# Patient Record
Sex: MALE | Race: WHITE | Employment: OTHER | URBAN - METROPOLITAN AREA
[De-identification: names, ages, dates, MRNs, and addresses within clinical notes are randomized per-mention and may not be internally consistent; named-entity substitution may affect disease eponyms.]

---

## 2018-02-08 RX ORDER — AZELASTINE HYDROCHLORIDE 0.5 MG/ML
1 SOLUTION/ DROPS OPHTHALMIC
Refills: 2 | COMMUNITY
Start: 2017-12-29

## 2018-02-08 RX ORDER — ATORVASTATIN CALCIUM 40 MG/1
40 TABLET, FILM COATED ORAL
COMMUNITY
Start: 2017-12-23

## 2018-02-13 ENCOUNTER — ANESTHESIA EVENT (OUTPATIENT)
Dept: SURGERY | Age: 63
DRG: 460 | End: 2018-02-13
Payer: MEDICARE

## 2018-02-14 ENCOUNTER — APPOINTMENT (OUTPATIENT)
Dept: GENERAL RADIOLOGY | Age: 63
DRG: 460 | End: 2018-02-14
Attending: ORTHOPAEDIC SURGERY
Payer: MEDICARE

## 2018-02-14 ENCOUNTER — HOSPITAL ENCOUNTER (INPATIENT)
Age: 63
LOS: 2 days | Discharge: HOME | DRG: 460 | End: 2018-02-16
Attending: ORTHOPAEDIC SURGERY | Admitting: ORTHOPAEDIC SURGERY
Payer: MEDICARE

## 2018-02-14 ENCOUNTER — ANESTHESIA (OUTPATIENT)
Dept: SURGERY | Age: 63
DRG: 460 | End: 2018-02-14
Payer: MEDICARE

## 2018-02-14 PROBLEM — M48.061 LUMBAR STENOSIS: Status: ACTIVE | Noted: 2018-02-14

## 2018-02-14 PROBLEM — G47.33 OSA (OBSTRUCTIVE SLEEP APNEA): Chronic | Status: ACTIVE | Noted: 2018-02-14

## 2018-02-14 LAB
GLUCOSE BLD STRIP.AUTO-MCNC: 107 MG/DL (ref 70–110)
GLUCOSE BLD STRIP.AUTO-MCNC: 148 MG/DL (ref 70–110)
GLUCOSE BLD STRIP.AUTO-MCNC: 151 MG/DL (ref 70–110)
GLUCOSE BLD STRIP.AUTO-MCNC: 182 MG/DL (ref 70–110)
HCT VFR BLD AUTO: 46.2 % (ref 36–48)
HGB BLD-MCNC: 16 G/DL (ref 13–16)

## 2018-02-14 PROCEDURE — 74011000258 HC RX REV CODE- 258

## 2018-02-14 PROCEDURE — 77030034850: Performed by: ORTHOPAEDIC SURGERY

## 2018-02-14 PROCEDURE — 74011250636 HC RX REV CODE- 250/636: Performed by: HOSPITALIST

## 2018-02-14 PROCEDURE — 74011250636 HC RX REV CODE- 250/636: Performed by: NURSE ANESTHETIST, CERTIFIED REGISTERED

## 2018-02-14 PROCEDURE — 74011250636 HC RX REV CODE- 250/636

## 2018-02-14 PROCEDURE — 97162 PT EVAL MOD COMPLEX 30 MIN: CPT

## 2018-02-14 PROCEDURE — 77030021678 HC GLIDESCP STAT DISP VERT -B: Performed by: ANESTHESIOLOGY

## 2018-02-14 PROCEDURE — C1762 CONN TISS, HUMAN(INC FASCIA): HCPCS | Performed by: ORTHOPAEDIC SURGERY

## 2018-02-14 PROCEDURE — 74011000250 HC RX REV CODE- 250: Performed by: ORTHOPAEDIC SURGERY

## 2018-02-14 PROCEDURE — 85018 HEMOGLOBIN: CPT | Performed by: HOSPITALIST

## 2018-02-14 PROCEDURE — 74011250636 HC RX REV CODE- 250/636: Performed by: PHYSICIAN ASSISTANT

## 2018-02-14 PROCEDURE — 74011000272 HC RX REV CODE- 272: Performed by: ORTHOPAEDIC SURGERY

## 2018-02-14 PROCEDURE — 74011250636 HC RX REV CODE- 250/636: Performed by: ORTHOPAEDIC SURGERY

## 2018-02-14 PROCEDURE — 74011000250 HC RX REV CODE- 250

## 2018-02-14 PROCEDURE — 0SG0071 FUSION OF LUMBAR VERTEBRAL JOINT WITH AUTOLOGOUS TISSUE SUBSTITUTE, POSTERIOR APPROACH, POSTERIOR COLUMN, OPEN APPROACH: ICD-10-PCS | Performed by: ORTHOPAEDIC SURGERY

## 2018-02-14 PROCEDURE — 77030008683 HC TU ET CUF COVD -A: Performed by: ANESTHESIOLOGY

## 2018-02-14 PROCEDURE — 74011636637 HC RX REV CODE- 636/637: Performed by: NURSE ANESTHETIST, CERTIFIED REGISTERED

## 2018-02-14 PROCEDURE — 77030008477 HC STYL SATN SLP COVD -A: Performed by: ANESTHESIOLOGY

## 2018-02-14 PROCEDURE — 76060000039 HC ANESTHESIA 4 TO 4.5 HR: Performed by: ORTHOPAEDIC SURGERY

## 2018-02-14 PROCEDURE — 76010000175 HC OR TIME 4 TO 4.5 HR INTENSV-TIER 1: Performed by: ORTHOPAEDIC SURGERY

## 2018-02-14 PROCEDURE — 77030027138 HC INCENT SPIROMETER -A

## 2018-02-14 PROCEDURE — 74011636637 HC RX REV CODE- 636/637: Performed by: HOSPITALIST

## 2018-02-14 PROCEDURE — 76210000016 HC OR PH I REC 1 TO 1.5 HR: Performed by: ORTHOPAEDIC SURGERY

## 2018-02-14 PROCEDURE — 77030032490 HC SLV COMPR SCD KNE COVD -B: Performed by: ORTHOPAEDIC SURGERY

## 2018-02-14 PROCEDURE — 77030011640 HC PAD GRND REM COVD -A: Performed by: ORTHOPAEDIC SURGERY

## 2018-02-14 PROCEDURE — 77030031139 HC SUT VCRL2 J&J -A: Performed by: ORTHOPAEDIC SURGERY

## 2018-02-14 PROCEDURE — 97530 THERAPEUTIC ACTIVITIES: CPT

## 2018-02-14 PROCEDURE — 77030037723 HC GRFT BN SUB BGNX -F: Performed by: ORTHOPAEDIC SURGERY

## 2018-02-14 PROCEDURE — 82962 GLUCOSE BLOOD TEST: CPT

## 2018-02-14 PROCEDURE — 74011250637 HC RX REV CODE- 250/637: Performed by: NURSE ANESTHETIST, CERTIFIED REGISTERED

## 2018-02-14 PROCEDURE — 77030031359 HC BLD BN MILL DISP STRY -E: Performed by: ORTHOPAEDIC SURGERY

## 2018-02-14 PROCEDURE — 77030003028 HC SUT VCRL J&J -A: Performed by: ORTHOPAEDIC SURGERY

## 2018-02-14 PROCEDURE — 77030018673: Performed by: ORTHOPAEDIC SURGERY

## 2018-02-14 PROCEDURE — 77030004391 HC BUR FLUT MEDT -C: Performed by: ORTHOPAEDIC SURGERY

## 2018-02-14 PROCEDURE — 36415 COLL VENOUS BLD VENIPUNCTURE: CPT | Performed by: ORTHOPAEDIC SURGERY

## 2018-02-14 PROCEDURE — C1713 ANCHOR/SCREW BN/BN,TIS/BN: HCPCS | Performed by: ORTHOPAEDIC SURGERY

## 2018-02-14 PROCEDURE — 77030013079 HC BLNKT BAIR HGGR 3M -A: Performed by: ANESTHESIOLOGY

## 2018-02-14 PROCEDURE — 01NB0ZZ RELEASE LUMBAR NERVE, OPEN APPROACH: ICD-10-PCS | Performed by: ORTHOPAEDIC SURGERY

## 2018-02-14 PROCEDURE — 83036 HEMOGLOBIN GLYCOSYLATED A1C: CPT | Performed by: ORTHOPAEDIC SURGERY

## 2018-02-14 PROCEDURE — 36415 COLL VENOUS BLD VENIPUNCTURE: CPT | Performed by: HOSPITALIST

## 2018-02-14 PROCEDURE — 72100 X-RAY EXAM L-S SPINE 2/3 VWS: CPT

## 2018-02-14 PROCEDURE — 77030018836 HC SOL IRR NACL ICUM -A: Performed by: ORTHOPAEDIC SURGERY

## 2018-02-14 PROCEDURE — 74011250637 HC RX REV CODE- 250/637: Performed by: NURSE PRACTITIONER

## 2018-02-14 PROCEDURE — 77030018723 HC ELCTRD BLD COVD -A: Performed by: ORTHOPAEDIC SURGERY

## 2018-02-14 PROCEDURE — 65270000029 HC RM PRIVATE

## 2018-02-14 PROCEDURE — 77030026133 HC BN CANC CHP CRSH LIFV -F: Performed by: ORTHOPAEDIC SURGERY

## 2018-02-14 DEVICE — BONE CHIP CANC CRSH 1.7-10MM -- READICRAFT: Type: IMPLANTABLE DEVICE | Site: SPINE LUMBAR | Status: FUNCTIONAL

## 2018-02-14 DEVICE — PLUG SPNL DIA5.5MM STD HELI FLNG POLARIS: Type: IMPLANTABLE DEVICE | Site: SPINE LUMBAR | Status: FUNCTIONAL

## 2018-02-14 DEVICE — IMPLANTABLE DEVICE: Type: IMPLANTABLE DEVICE | Site: SPINE LUMBAR | Status: FUNCTIONAL

## 2018-02-14 DEVICE — GRAFT BNE 6ML SUB OSTEOSPAN MOLD OPN BRL SYR MORPHEUS: Type: IMPLANTABLE DEVICE | Site: SPINE LUMBAR | Status: FUNCTIONAL

## 2018-02-14 RX ORDER — SUCCINYLCHOLINE CHLORIDE 20 MG/ML
INJECTION INTRAMUSCULAR; INTRAVENOUS AS NEEDED
Status: DISCONTINUED | OUTPATIENT
Start: 2018-02-14 | End: 2018-02-14 | Stop reason: HOSPADM

## 2018-02-14 RX ORDER — SODIUM CHLORIDE 9 MG/ML
250 INJECTION, SOLUTION INTRAVENOUS ONCE
Status: COMPLETED | OUTPATIENT
Start: 2018-02-15 | End: 2018-02-15

## 2018-02-14 RX ORDER — ATORVASTATIN CALCIUM 40 MG/1
40 TABLET, FILM COATED ORAL
Status: DISCONTINUED | OUTPATIENT
Start: 2018-02-14 | End: 2018-02-16 | Stop reason: HOSPADM

## 2018-02-14 RX ORDER — SODIUM CHLORIDE, SODIUM LACTATE, POTASSIUM CHLORIDE, CALCIUM CHLORIDE 600; 310; 30; 20 MG/100ML; MG/100ML; MG/100ML; MG/100ML
75 INJECTION, SOLUTION INTRAVENOUS CONTINUOUS
Status: DISCONTINUED | OUTPATIENT
Start: 2018-02-14 | End: 2018-02-14 | Stop reason: HOSPADM

## 2018-02-14 RX ORDER — MIDAZOLAM HYDROCHLORIDE 1 MG/ML
INJECTION, SOLUTION INTRAMUSCULAR; INTRAVENOUS AS NEEDED
Status: DISCONTINUED | OUTPATIENT
Start: 2018-02-14 | End: 2018-02-14 | Stop reason: HOSPADM

## 2018-02-14 RX ORDER — DEXTROSE 50 % IN WATER (D50W) INTRAVENOUS SYRINGE
25-50 AS NEEDED
Status: DISCONTINUED | OUTPATIENT
Start: 2018-02-14 | End: 2018-02-16 | Stop reason: HOSPADM

## 2018-02-14 RX ORDER — DEXTROSE 50 % IN WATER (D50W) INTRAVENOUS SYRINGE
25-50 AS NEEDED
Status: DISCONTINUED | OUTPATIENT
Start: 2018-02-14 | End: 2018-02-14 | Stop reason: HOSPADM

## 2018-02-14 RX ORDER — INSULIN LISPRO 100 [IU]/ML
INJECTION, SOLUTION INTRAVENOUS; SUBCUTANEOUS
Status: DISCONTINUED | OUTPATIENT
Start: 2018-02-14 | End: 2018-02-14

## 2018-02-14 RX ORDER — FENTANYL CITRATE 50 UG/ML
50 INJECTION, SOLUTION INTRAMUSCULAR; INTRAVENOUS AS NEEDED
Status: DISCONTINUED | OUTPATIENT
Start: 2018-02-14 | End: 2018-02-14 | Stop reason: HOSPADM

## 2018-02-14 RX ORDER — HYDROMORPHONE HYDROCHLORIDE 2 MG/ML
INJECTION, SOLUTION INTRAMUSCULAR; INTRAVENOUS; SUBCUTANEOUS AS NEEDED
Status: DISCONTINUED | OUTPATIENT
Start: 2018-02-14 | End: 2018-02-14 | Stop reason: HOSPADM

## 2018-02-14 RX ORDER — HYDROMORPHONE HYDROCHLORIDE 2 MG/ML
0.5 INJECTION, SOLUTION INTRAMUSCULAR; INTRAVENOUS; SUBCUTANEOUS
Status: DISCONTINUED | OUTPATIENT
Start: 2018-02-14 | End: 2018-02-14 | Stop reason: HOSPADM

## 2018-02-14 RX ORDER — INSULIN LISPRO 100 [IU]/ML
INJECTION, SOLUTION INTRAVENOUS; SUBCUTANEOUS ONCE
Status: DISCONTINUED | OUTPATIENT
Start: 2018-02-14 | End: 2018-02-14

## 2018-02-14 RX ORDER — OXYCODONE AND ACETAMINOPHEN 10; 325 MG/1; MG/1
2 TABLET ORAL
Status: DISCONTINUED | OUTPATIENT
Start: 2018-02-14 | End: 2018-02-16 | Stop reason: HOSPADM

## 2018-02-14 RX ORDER — ACETAMINOPHEN 325 MG/1
650 TABLET ORAL
Status: DISCONTINUED | OUTPATIENT
Start: 2018-02-14 | End: 2018-02-16 | Stop reason: HOSPADM

## 2018-02-14 RX ORDER — LIDOCAINE HYDROCHLORIDE 10 MG/ML
0.1 INJECTION INFILTRATION; PERINEURAL AS NEEDED
Status: DISCONTINUED | OUTPATIENT
Start: 2018-02-14 | End: 2018-02-16 | Stop reason: HOSPADM

## 2018-02-14 RX ORDER — FAMOTIDINE 20 MG/1
20 TABLET, FILM COATED ORAL ONCE
Status: COMPLETED | OUTPATIENT
Start: 2018-02-14 | End: 2018-02-14

## 2018-02-14 RX ORDER — LIDOCAINE HYDROCHLORIDE 20 MG/ML
INJECTION, SOLUTION EPIDURAL; INFILTRATION; INTRACAUDAL; PERINEURAL AS NEEDED
Status: DISCONTINUED | OUTPATIENT
Start: 2018-02-14 | End: 2018-02-14 | Stop reason: HOSPADM

## 2018-02-14 RX ORDER — INSULIN LISPRO 100 [IU]/ML
INJECTION, SOLUTION INTRAVENOUS; SUBCUTANEOUS
Status: DISCONTINUED | OUTPATIENT
Start: 2018-02-14 | End: 2018-02-16 | Stop reason: HOSPADM

## 2018-02-14 RX ORDER — ONDANSETRON 2 MG/ML
INJECTION INTRAMUSCULAR; INTRAVENOUS AS NEEDED
Status: DISCONTINUED | OUTPATIENT
Start: 2018-02-14 | End: 2018-02-14 | Stop reason: HOSPADM

## 2018-02-14 RX ORDER — MAGNESIUM SULFATE 100 %
4 CRYSTALS MISCELLANEOUS AS NEEDED
Status: DISCONTINUED | OUTPATIENT
Start: 2018-02-14 | End: 2018-02-14 | Stop reason: HOSPADM

## 2018-02-14 RX ORDER — MAGNESIUM SULFATE 100 %
16 CRYSTALS MISCELLANEOUS AS NEEDED
Status: DISCONTINUED | OUTPATIENT
Start: 2018-02-14 | End: 2018-02-16 | Stop reason: HOSPADM

## 2018-02-14 RX ORDER — DEXAMETHASONE SODIUM PHOSPHATE 4 MG/ML
INJECTION, SOLUTION INTRA-ARTICULAR; INTRALESIONAL; INTRAMUSCULAR; INTRAVENOUS; SOFT TISSUE AS NEEDED
Status: DISCONTINUED | OUTPATIENT
Start: 2018-02-14 | End: 2018-02-14 | Stop reason: HOSPADM

## 2018-02-14 RX ORDER — DOCUSATE SODIUM 100 MG/1
100 CAPSULE, LIQUID FILLED ORAL 2 TIMES DAILY
Status: DISCONTINUED | OUTPATIENT
Start: 2018-02-14 | End: 2018-02-16 | Stop reason: HOSPADM

## 2018-02-14 RX ORDER — NEOSTIGMINE METHYLSULFATE 5 MG/5 ML
SYRINGE (ML) INTRAVENOUS AS NEEDED
Status: DISCONTINUED | OUTPATIENT
Start: 2018-02-14 | End: 2018-02-14 | Stop reason: HOSPADM

## 2018-02-14 RX ORDER — VECURONIUM BROMIDE FOR INJECTION 1 MG/ML
INJECTION, POWDER, LYOPHILIZED, FOR SOLUTION INTRAVENOUS AS NEEDED
Status: DISCONTINUED | OUTPATIENT
Start: 2018-02-14 | End: 2018-02-14 | Stop reason: HOSPADM

## 2018-02-14 RX ORDER — MAGNESIUM SULFATE 100 %
4 CRYSTALS MISCELLANEOUS AS NEEDED
Status: DISCONTINUED | OUTPATIENT
Start: 2018-02-14 | End: 2018-02-14 | Stop reason: SDUPTHER

## 2018-02-14 RX ORDER — INSULIN LISPRO 100 [IU]/ML
INJECTION, SOLUTION INTRAVENOUS; SUBCUTANEOUS ONCE
Status: COMPLETED | OUTPATIENT
Start: 2018-02-14 | End: 2018-02-14

## 2018-02-14 RX ORDER — HYDROMORPHONE HYDROCHLORIDE 1 MG/ML
INJECTION, SOLUTION INTRAMUSCULAR; INTRAVENOUS; SUBCUTANEOUS
Status: COMPLETED
Start: 2018-02-14 | End: 2018-02-14

## 2018-02-14 RX ORDER — DIAZEPAM 5 MG/1
10 TABLET ORAL
Status: DISCONTINUED | OUTPATIENT
Start: 2018-02-14 | End: 2018-02-16 | Stop reason: HOSPADM

## 2018-02-14 RX ORDER — SODIUM CHLORIDE AND POTASSIUM CHLORIDE .9; .15 G/100ML; G/100ML
SOLUTION INTRAVENOUS CONTINUOUS
Status: DISCONTINUED | OUTPATIENT
Start: 2018-02-14 | End: 2018-02-16

## 2018-02-14 RX ORDER — SODIUM CHLORIDE 0.9 % (FLUSH) 0.9 %
5-10 SYRINGE (ML) INJECTION AS NEEDED
Status: DISCONTINUED | OUTPATIENT
Start: 2018-02-14 | End: 2018-02-16 | Stop reason: HOSPADM

## 2018-02-14 RX ORDER — GLYCOPYRROLATE 0.2 MG/ML
INJECTION INTRAMUSCULAR; INTRAVENOUS AS NEEDED
Status: DISCONTINUED | OUTPATIENT
Start: 2018-02-14 | End: 2018-02-14 | Stop reason: HOSPADM

## 2018-02-14 RX ORDER — ONDANSETRON 2 MG/ML
4 INJECTION INTRAMUSCULAR; INTRAVENOUS
Status: DISCONTINUED | OUTPATIENT
Start: 2018-02-14 | End: 2018-02-16 | Stop reason: HOSPADM

## 2018-02-14 RX ORDER — FENTANYL CITRATE 50 UG/ML
INJECTION, SOLUTION INTRAMUSCULAR; INTRAVENOUS AS NEEDED
Status: DISCONTINUED | OUTPATIENT
Start: 2018-02-14 | End: 2018-02-14 | Stop reason: HOSPADM

## 2018-02-14 RX ORDER — LISINOPRIL 20 MG/1
20 TABLET ORAL DAILY
Status: DISCONTINUED | OUTPATIENT
Start: 2018-02-15 | End: 2018-02-16 | Stop reason: HOSPADM

## 2018-02-14 RX ORDER — HYDROMORPHONE HYDROCHLORIDE 1 MG/ML
2 INJECTION, SOLUTION INTRAMUSCULAR; INTRAVENOUS; SUBCUTANEOUS
Status: DISCONTINUED | OUTPATIENT
Start: 2018-02-14 | End: 2018-02-14

## 2018-02-14 RX ORDER — CEFAZOLIN SODIUM 2 G/50ML
2 SOLUTION INTRAVENOUS
Status: COMPLETED | OUTPATIENT
Start: 2018-02-14 | End: 2018-02-14

## 2018-02-14 RX ORDER — SODIUM CHLORIDE 9 MG/ML
999 INJECTION, SOLUTION INTRAVENOUS ONCE
Status: COMPLETED | OUTPATIENT
Start: 2018-02-14 | End: 2018-02-14

## 2018-02-14 RX ORDER — SODIUM CHLORIDE 0.9 % (FLUSH) 0.9 %
5-10 SYRINGE (ML) INJECTION EVERY 8 HOURS
Status: DISCONTINUED | OUTPATIENT
Start: 2018-02-14 | End: 2018-02-16 | Stop reason: HOSPADM

## 2018-02-14 RX ORDER — SODIUM CHLORIDE, SODIUM LACTATE, POTASSIUM CHLORIDE, CALCIUM CHLORIDE 600; 310; 30; 20 MG/100ML; MG/100ML; MG/100ML; MG/100ML
75 INJECTION, SOLUTION INTRAVENOUS CONTINUOUS
Status: DISCONTINUED | OUTPATIENT
Start: 2018-02-14 | End: 2018-02-15

## 2018-02-14 RX ORDER — DIPHENHYDRAMINE HYDROCHLORIDE 50 MG/ML
25 INJECTION, SOLUTION INTRAMUSCULAR; INTRAVENOUS
Status: DISCONTINUED | OUTPATIENT
Start: 2018-02-14 | End: 2018-02-14 | Stop reason: HOSPADM

## 2018-02-14 RX ORDER — MORPHINE SULFATE 10 MG/ML
10 INJECTION, SOLUTION INTRAMUSCULAR; INTRAVENOUS
Status: DISCONTINUED | OUTPATIENT
Start: 2018-02-14 | End: 2018-02-16 | Stop reason: HOSPADM

## 2018-02-14 RX ORDER — PROPOFOL 10 MG/ML
INJECTION, EMULSION INTRAVENOUS AS NEEDED
Status: DISCONTINUED | OUTPATIENT
Start: 2018-02-14 | End: 2018-02-14 | Stop reason: HOSPADM

## 2018-02-14 RX ORDER — VANCOMYCIN HYDROCHLORIDE 1 G/20ML
INJECTION, POWDER, LYOPHILIZED, FOR SOLUTION INTRAVENOUS AS NEEDED
Status: DISCONTINUED | OUTPATIENT
Start: 2018-02-14 | End: 2018-02-14 | Stop reason: HOSPADM

## 2018-02-14 RX ORDER — HYDROMORPHONE HYDROCHLORIDE 1 MG/ML
2 INJECTION, SOLUTION INTRAMUSCULAR; INTRAVENOUS; SUBCUTANEOUS
Status: DISCONTINUED | OUTPATIENT
Start: 2018-02-14 | End: 2018-02-14 | Stop reason: RX

## 2018-02-14 RX ADMIN — VECURONIUM BROMIDE FOR INJECTION 2 MG: 1 INJECTION, POWDER, LYOPHILIZED, FOR SOLUTION INTRAVENOUS at 10:59

## 2018-02-14 RX ADMIN — PROPOFOL 150 MG: 10 INJECTION, EMULSION INTRAVENOUS at 07:43

## 2018-02-14 RX ADMIN — HYDROMORPHONE HYDROCHLORIDE 2 MG: 1 INJECTION, SOLUTION INTRAMUSCULAR; INTRAVENOUS; SUBCUTANEOUS at 13:47

## 2018-02-14 RX ADMIN — HYDROMORPHONE HYDROCHLORIDE 0.4 MG: 2 INJECTION, SOLUTION INTRAMUSCULAR; INTRAVENOUS; SUBCUTANEOUS at 11:36

## 2018-02-14 RX ADMIN — SODIUM CHLORIDE, SODIUM LACTATE, POTASSIUM CHLORIDE, AND CALCIUM CHLORIDE: 600; 310; 30; 20 INJECTION, SOLUTION INTRAVENOUS at 11:24

## 2018-02-14 RX ADMIN — VECURONIUM BROMIDE FOR INJECTION 8 MG: 1 INJECTION, POWDER, LYOPHILIZED, FOR SOLUTION INTRAVENOUS at 07:52

## 2018-02-14 RX ADMIN — CEFAZOLIN SODIUM 2 G: 2 SOLUTION INTRAVENOUS at 11:35

## 2018-02-14 RX ADMIN — MIDAZOLAM HYDROCHLORIDE 2 MG: 1 INJECTION, SOLUTION INTRAMUSCULAR; INTRAVENOUS at 07:35

## 2018-02-14 RX ADMIN — HYDROMORPHONE HYDROCHLORIDE 2 MG: 1 INJECTION, SOLUTION INTRAMUSCULAR; INTRAVENOUS; SUBCUTANEOUS at 17:47

## 2018-02-14 RX ADMIN — SODIUM CHLORIDE, SODIUM LACTATE, POTASSIUM CHLORIDE, AND CALCIUM CHLORIDE 75 ML/HR: 600; 310; 30; 20 INJECTION, SOLUTION INTRAVENOUS at 06:55

## 2018-02-14 RX ADMIN — VECURONIUM BROMIDE FOR INJECTION 2 MG: 1 INJECTION, POWDER, LYOPHILIZED, FOR SOLUTION INTRAVENOUS at 10:31

## 2018-02-14 RX ADMIN — HYDROMORPHONE HYDROCHLORIDE 1 MG: 2 INJECTION, SOLUTION INTRAMUSCULAR; INTRAVENOUS; SUBCUTANEOUS at 09:04

## 2018-02-14 RX ADMIN — INSULIN LISPRO 2 UNITS: 100 INJECTION, SOLUTION INTRAVENOUS; SUBCUTANEOUS at 17:46

## 2018-02-14 RX ADMIN — PROPOFOL 50 MG: 10 INJECTION, EMULSION INTRAVENOUS at 11:31

## 2018-02-14 RX ADMIN — DOCUSATE SODIUM 100 MG: 100 CAPSULE, LIQUID FILLED ORAL at 17:45

## 2018-02-14 RX ADMIN — CEFAZOLIN SODIUM 2 G: 2 SOLUTION INTRAVENOUS at 07:58

## 2018-02-14 RX ADMIN — INSULIN LISPRO 3 UNITS: 100 INJECTION, SOLUTION INTRAVENOUS; SUBCUTANEOUS at 11:47

## 2018-02-14 RX ADMIN — SODIUM CHLORIDE, SODIUM LACTATE, POTASSIUM CHLORIDE, AND CALCIUM CHLORIDE 75 ML/HR: 600; 310; 30; 20 INJECTION, SOLUTION INTRAVENOUS at 12:44

## 2018-02-14 RX ADMIN — HYDROMORPHONE HYDROCHLORIDE 0.5 MG: 1 INJECTION, SOLUTION INTRAMUSCULAR; INTRAVENOUS; SUBCUTANEOUS at 12:45

## 2018-02-14 RX ADMIN — SODIUM CHLORIDE AND POTASSIUM CHLORIDE 125 ML: 9; 1.49 INJECTION, SOLUTION INTRAVENOUS at 14:27

## 2018-02-14 RX ADMIN — DEXAMETHASONE SODIUM PHOSPHATE 4 MG: 4 INJECTION, SOLUTION INTRA-ARTICULAR; INTRALESIONAL; INTRAMUSCULAR; INTRAVENOUS; SOFT TISSUE at 08:28

## 2018-02-14 RX ADMIN — VECURONIUM BROMIDE FOR INJECTION 2 MG: 1 INJECTION, POWDER, LYOPHILIZED, FOR SOLUTION INTRAVENOUS at 09:06

## 2018-02-14 RX ADMIN — FENTANYL CITRATE 150 MCG: 50 INJECTION, SOLUTION INTRAMUSCULAR; INTRAVENOUS at 07:39

## 2018-02-14 RX ADMIN — WATER 1 G: 1 INJECTION INTRAMUSCULAR; INTRAVENOUS; SUBCUTANEOUS at 17:44

## 2018-02-14 RX ADMIN — VECURONIUM BROMIDE FOR INJECTION 2 MG: 1 INJECTION, POWDER, LYOPHILIZED, FOR SOLUTION INTRAVENOUS at 07:43

## 2018-02-14 RX ADMIN — HYDROMORPHONE HYDROCHLORIDE 0.6 MG: 2 INJECTION, SOLUTION INTRAMUSCULAR; INTRAVENOUS; SUBCUTANEOUS at 11:21

## 2018-02-14 RX ADMIN — VECURONIUM BROMIDE FOR INJECTION 2 MG: 1 INJECTION, POWDER, LYOPHILIZED, FOR SOLUTION INTRAVENOUS at 09:47

## 2018-02-14 RX ADMIN — Medication 10 ML: at 17:45

## 2018-02-14 RX ADMIN — HYDROMORPHONE HYDROCHLORIDE 0.5 MG: 1 INJECTION, SOLUTION INTRAMUSCULAR; INTRAVENOUS; SUBCUTANEOUS at 12:50

## 2018-02-14 RX ADMIN — FAMOTIDINE 20 MG: 20 TABLET, FILM COATED ORAL at 06:55

## 2018-02-14 RX ADMIN — Medication 3 MG: at 11:19

## 2018-02-14 RX ADMIN — LIDOCAINE HYDROCHLORIDE 100 MG: 20 INJECTION, SOLUTION EPIDURAL; INFILTRATION; INTRACAUDAL; PERINEURAL at 07:39

## 2018-02-14 RX ADMIN — ONDANSETRON 4 MG: 2 INJECTION INTRAMUSCULAR; INTRAVENOUS at 11:19

## 2018-02-14 RX ADMIN — FENTANYL CITRATE 50 MCG: 50 INJECTION, SOLUTION INTRAMUSCULAR; INTRAVENOUS at 08:30

## 2018-02-14 RX ADMIN — GLYCOPYRROLATE 0.4 MG: 0.2 INJECTION INTRAMUSCULAR; INTRAVENOUS at 11:19

## 2018-02-14 RX ADMIN — SUCCINYLCHOLINE CHLORIDE 100 MG: 20 INJECTION INTRAMUSCULAR; INTRAVENOUS at 07:43

## 2018-02-14 RX ADMIN — SODIUM CHLORIDE 999 ML/HR: 900 INJECTION, SOLUTION INTRAVENOUS at 18:54

## 2018-02-14 NOTE — PERIOP NOTES
Patient interviewed in holding area, identity and procedure verified. SCDs applied prior to anesthesia induction. Pressure preset. Patient was intubated in the supine position on stretcher, then transferred to OR table with assistance and placed in the prone position. Final position approved by Dr. Mercy Blakely. All lines are padded and secured. Segundo hugger cover applied by anesthesia provider. Temperature monitored by anesthesia provider. Family contacted at beginning of procedure. 500 ml Sterile water to sterile field to be used for instrument rinse.

## 2018-02-14 NOTE — CONSULTS
2 [facility]  Hospitalist Division    Consult Note    Patient: Rc Lemus MRN: [id number]  CSN: [id number]    YOB: 1955  Age: 57 y.o. Sex: male    DOA: 2/14/2018 LOS:  LOS: 0 days        Requesting Physician: Dr. Kyung Prater  Reason for Consultation:  Medical Management    Chief Complaint:  Lumbar stenosis     Assessment/Plan     Patient Active Problem List   Diagnosis Code    Cervical stenosis of spine M48.02    HTN (hypertension) I10    Type 2 diabetes mellitus ([address] E11.9    CAD (coronary artery disease) I25.10    Hyperlipidemia E78.5    Lumbar stenosis M48.061    ROHAN (obstructive sleep apnea) G47.33       A/P:  - S/p decompression fusion polaris L3-L4, decompression Left L5-S1  - HTN: lisinopril  - DM-II- SSI, diabetic diet  - Hypercholesterolemia: atorvastatin  - Bowel regimen: colace BID  - DVT prophylaxis:  -We appreciate the consultation for medical management and appreciate being able to be involved with their care during hospitalization. HPI:     Rc Lemus is a 57 y.o. male with a hx of HTN, CAD with stent x1 last in 2005, DM-II, hypercholesterolemia, ROHAN- does not wear cpap and lumbar stenosis who underwent decompression fusion polaris L3-L4, decompression Left L5-S1. Patient with back pain for years. Patient reports over the past several months pain has increased from intermittent aching to constant aching with intermittent sharp, stabbing pain his lower back. Patient also report LLE numbness prior to surgery. Hospitalist Team is consulted for ongoing medical management.      Past Medical History:   Diagnosis Date    CAD (coronary artery disease)     Diabetes ([address]     Herniated cervical disc     Hypertension        Past Surgical History:   Procedure Laterality Date    HX CERVICAL FUSION      HX HEART CATHETERIZATION  2005    CARDIAC STENT    HX LUMBAR LAMINECTOMY      HX TONSILLECTOMY         Family History   Problem Relation Age of Onset    Heart Disease Father        Social History     Social History    Marital status:      Spouse name: N/A    Number of children: N/A    Years of education: N/A     Social History Main Topics    Smoking status: Never Smoker    Smokeless tobacco: Never Used    Alcohol use No    Drug use: No    Sexual activity: Not Asked     Other Topics Concern    None     Social History Narrative       Prior to Admission medications    Medication Sig Start Date End Date Taking? Authorizing Provider   atorvastatin (LIPITOR) 40 mg tablet Take 40 mg by mouth nightly. 12/23/17  Yes Historical Provider   azelastine (OPTIVAR) 0.05 % ophthalmic solution as needed. 12/29/17  Yes Historical Provider   canagliflozin (INVOKANA) 300 mg tab Take 300 mg by mouth Daily (before breakfast). Yes Historical Provider   lisinopril (PRINIVIL, ZESTRIL) 20 mg tablet Take  by mouth daily. Yes Historical Provider   glipiZIDE SR (GLUCOTROL) 2.5 mg CR tablet Take 7.5 mg by mouth two (2) times a day. Yes Historical Provider   multivitamin (ONE A DAY) tablet Take 1 Tab by mouth daily.    Yes Historical Provider       Allergies   Allergen Reactions    Codeine Other (comments)     HEADACHES       Review of Systems  - fever, - chills, - fatigue, - weight loss, - night sweats   - sore throat, - sinus congestion, - lymphadenopathy, - vision changes  - CP, -  palpitations  - dyspnea on exertion, - dyspnea at rest, - cough, - hemoptysis  - nausea, - vomiting, - diarrhea, - abdominal pain, - reflux, - dysphagia  - dysuria, - hematuria, - urinary frequency  - rash, - pruritis  + back pain, - neck pain, - myalgia, - arthralgia  - H/A, + numbness, - tingling, - weakness, - slurred speech    Physical Exam:      Visit Vitals    /89    Pulse (!) 120    Temp 98.3 °F (36.8 °C)    Resp 18    Ht 5' 9\" (1.753 m)    Wt 104.3 kg (230 lb)    SpO2 92%    BMI 33.97 kg/m2       Physical Exam:  Gen: In general, this is a well nourished  male in no acute distress on 2L NC.  HEENT: Sclerae nonicteric. Oral mucous membranes moist.    Neck: Supple with midline trachea. CV: RRR without murmur or rub appreciated. Resp:Respirations are unlabored without use of accessory muscles. Lung fields bilaterally without wheezes or rhonchi. Abd: Soft, nontender, nondistended. Normoactive bowel sounds. Extrem: Extremities are warm, without cyanosis or clubbing. No pitting pretibial edema. Palpable distal pulses X 4.   Skin: Warm, no visible rashes. Lumbar dressing with small amount sanguinous drainage  Neuro: Patient is alert, oriented, and cooperative. No obvious focal defects. Moves all 4 extremities.     Labs Reviewed:    Recent Results (from the past 24 hour(s))   GLUCOSE, POC    Collection Time: 02/14/18  6:54 AM   Result Value Ref Range    Glucose (POC) 107 70 - 110 mg/dL   GLUCOSE, POC    Collection Time: 02/14/18 11:44 AM   Result Value Ref Range    Glucose (POC) 182 (H) 70 - 110 mg/dL               SALEEM Saenz  [address] [place] [address]  Hospitalist Division  Pager:  [phone]  Office:  [phone]'

## 2018-02-14 NOTE — ANESTHESIA POSTPROCEDURE EVALUATION
Post-Anesthesia Evaluation and Assessment    Patient: Guillermina Saleem MRN: [id number]  SSN: [id number]    YOB: 1955  Age: 57 y.o. Sex: male      Data from PACU flowsheet    Cardiovascular Function/Vital Signs  Visit Vitals    /89    Pulse 93    Temp 36.3 °C (97.3 °F)    Resp 16    Ht 5' 9\" (1.753 m)    Wt 104.3 kg (230 lb)    SpO2 95%    BMI 33.97 kg/m2       Patient is status post general anesthesia for Procedure(s):  DECOMPRESSION FUSION POLARIS L3-4, DECOMPRESSION LEFT L5-S1. Nausea/Vomiting: controlled    Postoperative hydration reviewed and adequate. Pain:  Pain Scale 1: Numeric (0 - 10) (02/14/18 1155)  Pain Intensity 1: 0 (02/14/18 1155)   Managed      Mental Status and Level of Consciousness: Alert and oriented     Pulmonary Status:   O2 Device: Room air (02/14/18 1155)   Adequate oxygenation and airway patent    Complications related to anesthesia: None    Post-anesthesia assessment completed.  No concerns    Signed By: Micaela Jaquez MD     February 14, 2018

## 2018-02-14 NOTE — PERIOP NOTES
Pt's chart opened to check on orders regarding gallardo catheter. Will cont to monitor. 1230  Gallardo cath discontinued per pt's request.   300 ml clear yellow urine noted. Assumed total care of pt from [facility], RN. Pt medicated as needed warranted. 1310  Wife at bedside. 1330  Dr. Sanchez Ear at bedside. Will cont to monitor. 1510  TRANSFER - OUT REPORT:    Verbal report given to ALOK Gardner(name) on Ruby Loera  being transferred to 2200 (unit) for routine post - op       Report consisted of patients Situation, Background, Assessment and   Recommendations(SBAR). Information from the following report(s) SBAR, Kardex, OR Summary, Intake/Output, MAR, Recent Results and Cardiac Rhythm sinus tachy was reviewed with the receiving nurse. Lines:   Peripheral IV 02/14/18 Right Hand (Active)   Site Assessment Clean, dry, & intact 2/14/2018 11:49 AM   Phlebitis Assessment 0 2/14/2018 11:49 AM   Infiltration Assessment 0 2/14/2018 11:49 AM   Dressing Status Clean, dry, & intact 2/14/2018 11:49 AM   Dressing Type Tape;Transparent 2/14/2018 11:49 AM   Hub Color/Line Status Pink; Infusing 2/14/2018 11:49 AM   Action Taken Open ports on tubing capped 2/14/2018 11:49 AM   Alcohol Cap Used Yes 2/14/2018 11:49 AM        Opportunity for questions and clarification was provided.       Patient transported with:   O2 @ 2 liters  Registered Nurse  [facility]

## 2018-02-14 NOTE — PROGRESS NOTES
Received pt via bed awake and alert in NAD. Dressing to lower back with drainage noted. Oriented to call bell, phone and IS with pt giving return demonstration. Wife at [facility].

## 2018-02-14 NOTE — PROGRESS NOTES
1529 patient arrived to unit     1812 patient in bed, meds given, call bell within reach, no acute distress noted, patients wife present at bedside , patient received diabetic tray     1822 129s and 130s , MD Sylvia Wood made aware of patient's sustained elevated HR, made aware dilaudid given, HR remain in 127 to 130    1831 HR now 127 MD Sylvia Wood made aware confirmed with patient meds taken in regards to BP include lipitor and lisinopril, instructed will assess patient's meds to determine med to decrease HR    1828 bolus infusing     1933 Bedside and Verbal shift change report given to [address] [place] [address],[address] (oncoming nurse) by Elroy Runner, RN   (offgoing nurse). Report included the following information SBAR, Kardex and MAR. On coming nurse [phone] aware to f/u with hospitalist in regards to patient's HR. Bolus completed, continuous IVF restarted.

## 2018-02-14 NOTE — IP AVS SNAPSHOT
Irineo Lozano 
 
 
 [address] [place] [address] Blvd Patient: Romel Haddad MRN: [id number] :1955 About your hospitalization You were admitted on:  2018 You last received care in the:  [address] [place] [address],[address] You were discharged on:  2018 Why you were hospitalized Your primary diagnosis was:  Lumbar Stenosis Your diagnoses also included:  Type 2 Diabetes Mellitus (Hcc), Htn (Hypertension), Hyperlipidemia, Cad (Coronary Artery Disease), Keven (Obstructive Sleep Apnea) Follow-up Information Follow up With Details Comments Contact Info Ranell Lefort, MD   [facility] [address] [facility] [address] 
[phone] Judy Larios MD In 1 month  [address] [facility] [address] [phone] Discharge Orders None A check remberto indicates which time of day the medication should be taken. My Medications ASK your doctor about these medications Instructions Each Dose to Equal  
 Morning Noon Evening Bedtime  
 atorvastatin 40 mg tablet Commonly known as:  LIPITOR Your last dose was: Your next dose is: Take 40 mg by mouth nightly. 40 mg  
    
   
   
   
  
 azelastine 0.05 % ophthalmic solution Commonly known as:  OPTIVAR Your last dose was: Your next dose is:    
   
   
 as needed. glipiZIDE SR 2.5 mg CR tablet Commonly known as:  GLUCOTROL XL Your last dose was: Your next dose is: Take 7.5 mg by mouth two (2) times a day. 7.5 mg  
    
   
   
   
  
 INVOKANA 300 mg tablet Generic drug:  canagliflozin Your last dose was: Your next dose is: Take 300 mg by mouth Daily (before breakfast). 300 mg  
    
   
   
   
  
 lisinopril 20 mg tablet Commonly known as:  Alcario Ry  
   
 Your last dose was: Your next dose is: Take  by mouth daily. multivitamin tablet Commonly known as:  ONE A DAY Your last dose was: Your next dose is: Take 1 Tab by mouth daily. 1 Tab Discharge Instructions DISCHARGE SUMMARY from Nurse PATIENT INSTRUCTIONS: 
 
 
F-face looks uneven A-arms unable to move or move unevenly S-speech slurred or non-existent T-time-call 911 as soon as signs and symptoms begin-DO NOT go Back to bed or wait to see if you get better-TIME IS BRAIN. Warning Signs of HEART ATTACK Call 911 if you have these symptoms: 
? Chest discomfort. Most heart attacks involve discomfort in the center of the chest that lasts more than a few minutes, or that goes away and comes back. It can feel like uncomfortable pressure, squeezing, fullness, or pain. ? Discomfort in other areas of the upper body. Symptoms can include pain or discomfort in one or both arms, the back, neck, jaw, or stomach. ? Shortness of breath with or without chest discomfort. ? Other signs may include breaking out in a cold sweat, nausea, or lightheadedness. Don't wait more than five minutes to call [address]! Fast action can save your life. Calling 911 is almost always the fastest way to get lifesaving treatment. Emergency Medical Services staff can begin treatment when they arrive  up to an hour sooner than if someone gets to the hospital by car. The discharge information has been reviewed with the patient.   The patient verbalized understanding. Discharge medications reviewed with the patient and appropriate educational materials and side effects teaching were provided. \Patient armband removed and shredded. ___________________________________________________________________________________________________________________________________ Bosid[employer] Announcement We are excited to announce that we are making your provider's discharge notes available to you in The Local. You will see these notes when they are completed and signed by the physician that discharged you from your recent hospital stay. If you have any questions or concerns about any information you see in The Local, please call the Health Information Department where you were seen or reach out to your Primary Care Provider for more information about your plan of care. Introducing [facility]! Preethi Watters introduces The Local patient portal. Now you can access parts of your medical record, email your doctor's office, and request medication refills online. 1. In your internet browser, go to [email or web address] the [email or web address] 2. Click on the First Time User? Click Here link in the Sign In box. You will see the New Member Sign Up page. 3. Enter your The Local Access Code exactly as it appears below. You will not need to use this code after youve completed the sign-up process. If you do not sign up before the expiration date, you must request a new code. · The Local Access Code: [email or web address] Expires: 5/14/2018 11:03 AM 
 
4. Enter the last four digits of your Social Security Number (xxxx) and Date of Birth (mm/dd/yyyy) as indicated and click Submit. You will be taken to the next sign-up page. 5. Create a The Local ID. This will be your The Local login ID and cannot be changed, so think of one that is secure and easy to remember. 6. Create a The Local password. You can change your password at any time. 7. Enter your Password Reset Question and Answer. This can be used at a later time if you forget your password. 8. Enter your e-mail address. You will receive e-mail notification when new information is available in [address] 9. Click Sign Up. You can now view and download portions of your medical record. 10. Click the Download Summary menu link to download a portable copy of your medical information. If you have questions, please visit the Frequently Asked Questions section of the BitGymt website. Remember, ObserveIT is NOT to be used for urgent needs. For medical emergencies, dial 911. Now available from your iPhone and Android! Unresulted Labs-Please follow up with your PCP about these lab tests Order Current Status NC XR TECHNOLOGIST SERVICE In process T4, FREE In process Providers Seen During Your Hospitalization Provider Specialty Primary office phone Betsey Babb MD Orthopedic Surgery [phone] Your Primary Care Physician (PCP) Primary Care Physician Office Phone Office Fax [address], [address] [phone] You are allergic to the following Allergen Reactions Codeine Other (comments) HEADACHES Recent Documentation Height Weight BMI Smoking Status 1.753 m 104.3 kg 33.97 kg/m2 Never Smoker Emergency Contacts Name Discharge Info Relation Home Work Mobile [employer] CAREGIVER [3] Spouse [3]   [phone] Patient Belongings The following personal items are in your possession at time of discharge: 
  Dental Appliances: None  Visual Aid: Glasses, At bedside      Home Medications: None   Jewelry: None  Clothing: Shirt, Socks, Footwear, Undergarments, Jacket/Coat, Pants (everything given to wife)    Other Valuables: None Discharge Instructions Attachments/References LUMBAR SPINAL FUSION: POST-OP (ENGLISH) Patient Handouts Lumbar Spinal Fusion: What to Expect at Home Your Recovery After surgery, you can expect your back to feel stiff and sore. You may have trouble sitting or standing in one position for very long and may need pain medicine in the weeks after your surgery. It may take 4 to 6 weeks to get back to doing simple activities, such as light housework. It may take 6 months to a year for your back to get better completely. You may need to wear a back brace while your back heals. And your doctor may have you go to physical therapy. If your job doesn't require physical labor, you will probably be able to go back to work after 1 or 2 months. If your job involves light physical labor, it may take 3 to 6 months. Most people whose jobs involved heavy labor can never return to those jobs. This care sheet gives you a general idea about how long it will take for you to recover. But each person recovers at a different pace. Follow the steps below to get better as quickly as possible. How can you care for yourself at home? Activity ? · Rest when you feel tired. Getting enough sleep will help you recover. ? · Try to walk each day. Start by walking a little more than you did the day before. Bit by bit, increase the amount you walk. Walking boosts blood flow and helps prevent pneumonia and constipation. Walking may also decrease your muscle soreness after surgery. ? · If advised by your doctor, you may need to avoid lifting anything that would cause excessive strain on your back. This may include a child, heavy grocery bags and milk containers, a heavy briefcase or backpack, cat litter or dog food bags, or a vacuum . ? · Avoid strenuous activities, such as bicycle riding, jogging, weight lifting, or aerobic exercise, until your doctor says it is okay. ? · Do not drive for 2 to 4 weeks after your surgery or until your doctor says it is okay.   
? · Avoid riding in a car for more than 30 minutes at a time for 2 to 4 weeks after surgery. If you must ride in a car for a longer distance, stop often to walk and stretch your legs. ? · Try to change your position about every 30 minutes while sitting or standing. This will help decrease your back pain while you are healing. ? · You will probably need to take at least 4 to 6 weeks off from work. It depends on the type of work you do and how you feel. ? · You may have sex as soon as you feel able, but avoid positions that put stress on your back or cause pain. Diet ? · You can eat your normal diet. If your stomach is upset, try bland, low-fat foods like plain rice, broiled chicken, toast, and yogurt. ? · Drink plenty of fluids (unless your doctor tells you not to). ? · You may notice that your bowel movements are not regular right after your surgery. This is common. Try to avoid constipation and straining with bowel movements. You may want to take a fiber supplement every day. If you have not had a bowel movement after a couple of days, ask your doctor about taking a mild laxative. Medicines ? · Be safe with medicines. Take pain medicines exactly as directed. ¨ If the doctor gave you a prescription medicine for pain, take it as prescribed. ¨ If you are not taking a prescription pain medicine, ask your doctor if you can take an over-the-counter medicine. ? · If your doctor prescribed antibiotics, take them as directed. Do not stop taking them just because you feel better. You need to take the full course of antibiotics. ? · If you think your pain medicine is making you sick to your stomach: 
¨ Take your medicine after meals (unless your doctor has told you not to). ¨ Ask your doctor for a different pain medicine. Incision care ? · You will be given specific instructions about how to care for the cuts (incisions) the doctor made. The instructions will depend on the type of materials used to close the cut. Exercise ? · Do back exercises as instructed by your doctor. ? · Your doctor may advise you to work with a physical therapist to improve the strength and flexibility of your back. Other instructions ? · To reduce stiffness and help sore muscles, use a warm water bottle, a heating pad set on low, or a warm cloth on your back. Do not put heat right over the incision. Do not go to sleep with a heating pad on your skin. Follow-up care is a key part of your treatment and safety. Be sure to make and go to all appointments, and call your doctor if you are having problems. It's also a good idea to know your test results and keep a list of the medicines you take. When should you call for help? Call 911 anytime you think you may need emergency care. For example, call if: 
? · You passed out (lost consciousness). ? · You have sudden chest pain and shortness of breath, or you cough up blood. ? · You are unable to move a leg at all. ?Call your doctor now or seek immediate medical care if: 
? · You have pain that does not get better after you take pain pills. ? · You have new or worse symptoms in your legs or buttocks. Symptoms may include: ¨ Numbness or tingling. ¨ Weakness. ¨ Pain. ? · You lose bladder or bowel control. ? · You have loose stitches, or your incision comes open. ? · You have blood or fluid draining from the incision. ? · You have signs of infection, such as: 
¨ Increased pain, swelling, warmth, or redness. ¨ Pus draining from the incision. ¨ A fever. ? Watch closely for any changes in your health, and be sure to contact your doctor if: 
? · You do not have a bowel movement after taking a laxative. ? · You are not getting better as expected. Where can you learn more? Go to http://yenni-tate.info/. Enter S727 in the search box to learn more about \"Lumbar Spinal Fusion: What to Expect at Home. \" Current as of: March 21, 2017 Content Version: 11.4 © 5562-2605 Healthwise, Incorporated. Care instructions adapted under license by [employer] (which disclaims liability or warranty for this information). If you have questions about a medical condition or this instruction, always ask your healthcare professional. [address] any warranty or liability for your use of this information. Please provide this summary of care documentation to your next provider. Signatures-by signing, you are acknowledging that this After Visit Summary has been reviewed with you and you have received a copy. Patient Signature:  ____________________________________________________________ Date:  ____________________________________________________________  
  
Mary Osorio Provider Signature:  ____________________________________________________________ Date:  ____________________________________________________________

## 2018-02-14 NOTE — OP NOTES
BRIEF OPERATIVE NOTE    Date of Procedure: 2/14/2018     Preoperative Diagnosis: SPONDYLOLISTHESIS STENOSIS M43.16 M48.061    Postoperative Diagnosis: SPONDYLOLISTHESIS STENOSIS M43.16 M48.061      Procedure: Procedure(s):  DECOMPRESSION FUSION POLARIS L3-4, DECOMPRESSION LEFT L5-S1    Surgeon(s) and Role:     * Anthony Ponce MD - Primary    Anesthesia: General     Findings: deg spondylo l3-4 with stenosis, stenosis l l5-s1     Estimated Blood Loss: 900  [id number] cell saver      [id number]        [id number]    Specimens: * No specimens in log *     Tubes/Drains: None    Needle/sponge count:  Correct    Complications: 0  In rr at gs intact bilat    Plan    Up at [facility]  Med to see for DM  PT ambulate  Dc gallardo in am  Home 1-2 days with tlso and percocet  rto 1 month

## 2018-02-14 NOTE — PROGRESS NOTES
Problem: Mobility Impaired (Adult and Pediatric)  Goal: *Acute Goals and Plan of Care (Insert Text)  Physical Therapy Goals  Initiated 2/14/2018 and to be accomplished within 7 day(s) following back precautions   1. Patient will move from supine to sit and sit to supine , scoot up and down and roll side to side in bed with modified independence. 2.  Patient will transfer from bed to chair and chair to bed with modified independence using the least restrictive device. 3.  Patient will perform sit to stand with modified independence. 4.  Patient will ambulate with modified independence for 300 feet with the least restrictive device. 5.  Patient will ascend/descend 15 stairs with  handrail(s) with modified independence to egress home . Outcome: Progressing Towards Goal  physical Therapy EVALUATION      Mikael Guardado  LUMBAR PRECAUTIONS    DO NOT TWIST your back. DO NOT BEND to  objects. Use the Squat Lift method or use a *REACHER STICK. Get out of bed using the Log Roll method. DO NOT LIFT more than 5 pounds until cleared by your physician. DO NOT RIDE in a car except to go home from the hospital or to see your physician. DO NOT DRIVE until cleared by your physician. Limit sitting to less than one hour at a time. Use a long handled back brush/sponge to wash your feet if you cannot reach them. Squat or sit on a chair when removing items from the refrigerator. Put all frequently used kitchen items within easy reach. Sit to put on pants, socks, and shoes. Do not perform lower body dressing while standing up. Carry items close to your body. If needed, sit on a shower chair and use an extended hand-held shower for bathing. Do not shower until cleared by physician. Conserve energy by pacing  yourself during your daily activities.       *Reachers are available at local drug stores for about $10 - $15 or can be ordered from a catalog provided by the therapy department. In drug stores they are often sold  under the name of [employer].       Patient: Betsey Perez (32 y.o. male)  Date: 2/14/2018  Primary Diagnosis: SPONDYLOLISTHESIS STENOSIS M43.16 M48.061  Lumbar stenosis  Procedure(s) (LRB):  DECOMPRESSION FUSION POLARIS L3-4, DECOMPRESSION LEFT L5-S1 (N/A) Day of Surgery   Precautions:   Fall, Back, Skin    ASSESSMENT :  Based on the objective data described below, the patient presents with Decreased Strength  Decreased Transfer Abilities  Decreased Ambulation Ability/Technique  Decreased Balance  Increased Pain  Decreased Knowledge of Precautions secondary to lumbar pain, decreased strength and precautions. Patient requires between minimal assistance/contact guard assist and supervision/set-up for bed mobility, transfers and ambulation. Pain reported 5/10 pre and post.  No brace present in room and limited mobility wife reports brace is in car and will be in room tomorrow. .  Patient demonstrates a good understanding of lumbar precautions education also on  Log roll safety and plan of care needs reinforcement. .  Recommendations for nursing:  Written on communication board: Up with assist log roll back brace, PT times 9-10 and 12-1  Verbally communicated to: Gino Meneses RN  Patient will benefit from skilled intervention to address the above impairments.   Patients rehabilitation potential is considered to be Fair  Factors which may influence rehabilitation potential include:   []         None noted  []         Mental ability/status  [x]         Medical condition  []         Home/family situation and support systems  []         Safety awareness  [x]         Pain tolerance/management  []         Other:      PLAN :  Recommendations and Planned Interventions:  [x]           Bed Mobility Training             [x]    Neuromuscular Re-Education  [x]           Transfer Training                   [x]    Orthotic/Prosthetic Training  [x] Gait Training                          []    Modalities  []           Therapeutic Exercises          []    Edema Management/Control  [x]           Therapeutic Activities            [x]    Patient and Family Training/Education  []           Other (comment):    Frequency/Duration: Patient will be followed by physical therapy 1-2 times per day/4-7 days per week to address goals. Discharge Recommendations: Rehab and Home Health ? Further Equipment Recommendations for Discharge: rolling walker     SUBJECTIVE:   Patient stated .    OBJECTIVE DATA SUMMARY:     Past Medical History:   Diagnosis Date    CAD (coronary artery disease)     Diabetes ([facility] [address]     Herniated cervical disc     Hypertension      Past Surgical History:   Procedure Laterality Date    HX CERVICAL FUSION      HX HEART CATHETERIZATION  2005    CARDIAC STENT    HX LUMBAR LAMINECTOMY      HX TONSILLECTOMY       Barriers to Learning/Limitations: yes;  physical  Compensate with: visual, verbal, tactile, kinesthetic cues/model  GCODES(GP):Mobility  Current  CL= 60-79%   Goal  CI= 1-19%. The severity rating is based on the Other [employer] Balance Scale2/5   [facility] Standing Balance Scale2/5  0: Pt performs 25% or less of standing activity (Max assist) CN, 100% impaired. 1: Pt supports self with upper extremities but requires therapist assistance. Pt performs 25-50% of effort (Mod assist) CM, 80% to <100% impaired. 1+: Pt supports self with upper extremities but requires therapist assistance. Pt performs >50% effort. (Min assist). CL, 60% to <80% impaired. 2: Pt supports self independently with both upper extremities (walker, crutches, parallel bars). CL, 60% to <80% impaired. 2+: Pt support self independently with 1 upper extremity (cane, crutch, 1 parallel bar). CK, 40% to <60% impaired. 3: Pt stands without upper extremity support for up to 30 seconds. CK, 40% to <60% impaired.   3+: Pt stands without upper extremity support for 30 seconds or greater. CJ, 20% to <40% impaired. 4: Pt independently moves and returns center of gravity 1-2 inches in one plane. CJ, 20% to <40% impaired. 4+: Pt independently moves and returns center of gravity 1-2 inches in multiple planes. CI, 1% to <20% impaired. 5: Pt independently moves and returns center of gravity in all planes greater than 2 inches. CH, 0% impaired. Eval Complexity: History: HIGH Complexity :3+ comorbidities / personal factors will impact the outcome/ POC Exam:MEDIUM Complexity : 3 Standardized tests and measures addressing body structure, function, activity limitation and / or participation in recreation  Presentation: MEDIUM Complexity : Evolving with changing characteristics  Clinical Decision Making:Medium Complexity [facility] Standing Balance Scale2/5 Overall Complexity:MEDIUM     Prior Level of Function/Home Situation: I with adl's and ambulation without assistive device. Home Situation  Home Environment: Private residence  # Steps to Enter: 15  Rails to Enter: Yes  One/Two Story Residence:  (lives on second floor )  Living Alone: No  Support Systems: Family member(s)  Patient Expects to be Discharged to[de-identified] Private residence  Current DME Used/Available at Home: None  Critical Behavior:  Neurologic State: Alert  Orientation Level: Oriented X4  Cognition: Follows commands; Appropriate decision making  Safety/Judgement: Fall prevention  Psychosocial  Patient Behaviors: Calm; Cooperative  Family  Behaviors: Cooperative;Calm; Appropriate for situation  Purposeful Interaction: Yes  Pt Identified Daily Priority: Clinical issues (comment)  Caritas Process: Nurture loving kindness;Establish trust;Teaching/learning; Attend basic human needs;Create healing environment;Supportive expression;Creative use of self  Caring Interventions: Therapeutic modalities; Reassure  Reassure: Acceptance; Therapeutic listening;Quiet presence;Caring rounds  Therapeutic Modalities: Intentional therapeutic touch  Skin Condition/Temp: Warm  Family  Behaviors: Cooperative;Calm; Appropriate for situation  Skin Integrity: Incision (comment) (back)  Skin Integumentary  Skin Color: Appropriate for ethnicity  Skin Condition/Temp: Warm  Skin Integrity: Incision (comment) (back)  Turgor: Non-tenting  Hair Growth: Present  Varicosities: Absent  Strength:    Strength: Generally decreased, functional (3+/5 both legs )  Tone & Sensation:   Tone: Normal  Sensation: Intact  Range Of Motion:  AROM: Within functional limits  PROM: Within functional limits  Functional Mobility:  Bed Mobility:  Rolling: Minimum assistance; Additional time;Assist x1  Supine to Sit: Minimum assistance; Additional time;Assist x1  Sit to Supine: Minimum assistance; Additional time;Assist x1  Transfers:  Sit to Stand: Minimum assistance;Contact guard assistance; Additional time;Assist x1  Stand to Sit: Contact guard assistance;Minimum assistance; Additional time;Assist x1  Balance:   Sitting: Impaired  Sitting - Static: Good (unsupported); Fair (occasional)  Sitting - Dynamic: Fair (occasional)  Standing: Impaired  Standing - Static: Fair  Standing - Dynamic : Fair  Ambulation/Gait Training:  Distance (ft): 3 Feet (ft)  Assistive Device: Walker, rolling (no brace in room )  Ambulation - Level of Assistance: Contact guard assistance;Minimal assistance; Additional time;Assist x1  Gait Description (WDL): Exceptions to WDL  Gait Abnormalities: Antalgic;Decreased step clearance (side step )  Base of Support: Center of gravity altered  Speed/Ivy: Delayed;Slow;Pace decreased (<100 feet/min) (side step )  Step Length: Left shortened;Right shortened  Interventions: Safety awareness training;Verbal cues; Visual/Demos    Pain: pre and post 8/10   Pain Scale 1: Numeric (0 - 10)  Activity Tolerance:   Fair   Please refer to the flowsheet for vital signs taken during this treatment.   After treatment:   []         Patient left in no apparent distress sitting up in chair  [x]         Patient left in no apparent distress in bed  [x]         Call bell left within reach  [x]         Nursing notified  [x]         Caregiver present  []         Bed alarm activated    COMMUNICATION/EDUCATION:   [x]         Fall prevention education was provided and the patient/caregiver indicated understanding. [x]         Patient/family have participated as able in goal setting and plan of care. [x]         Patient/family agree to work toward stated goals and plan of care. []         Patient understands intent and goals of therapy, but is neutral about his/her participation. []         Patient is unable to participate in goal setting and plan of care. Patient educated on role of physical therapy and lumbar precautions.  Needs reinforcement      Thank you for this referral.  Rodriguez Mccracken, PT   Time Calculation: 25 mins

## 2018-02-14 NOTE — ANESTHESIA PREPROCEDURE EVALUATION
Anesthetic History   No history of anesthetic complications            Review of Systems / Medical History  Patient summary reviewed and pertinent labs reviewed    Pulmonary  Within defined limits                 Neuro/Psych   Within defined limits           Cardiovascular    Hypertension          CAD and cardiac stents    Exercise tolerance: >4 METS     GI/Hepatic/Renal  Within defined limits              Endo/Other    Diabetes: type 2    Obesity     Other Findings   Comments: Documentation of current medication  Current medications obtained, documented and obtained? YES      Risk Factors for Postoperative nausea/vomiting:       History of postoperative nausea/vomiting? NO       Female? NO       Motion sickness? NO       Intended opioid administration for postoperative analgesia? YES      Smoking Abstinence:  Current Smoker? NO  Elective Surgery? YES  Seen preoperatively by anesthesiologist or proxy prior to day of surgery? YES  Pt abstained from smoking 24 hours prior to anesthesia?  N/A    Preventive care/screening for High Blood Pressure:  Aged 18 years and older: YES  Screened for high blood pressure: YES  Patients with high blood pressure referred to primary care provider   for BP management: YES                 Physical Exam    Airway  Mallampati: II  TM Distance: 4 - 6 cm  Neck ROM: normal range of motion   Mouth opening: Normal     Cardiovascular  Regular rate and rhythm,  S1 and S2 normal,  no murmur, click, rub, or gallop  Rhythm: regular  Rate: normal         Dental  No notable dental hx       Pulmonary  Breath sounds clear to auscultation               Abdominal  GI exam deferred       Other Findings            Anesthetic Plan    ASA: 3  Anesthesia type: general          Induction: Intravenous  Anesthetic plan and risks discussed with: Patient

## 2018-02-14 NOTE — OP NOTES
[address] REPORT    Zora Monteiro  MR#: [id number]  : 1955  ACCOUNT #: [de-identified]   DATE OF SERVICE: 2018    PREOPERATIVE DIAGNOSES  1. Degenerative spondylolisthesis, L3-L4. 2.  Lumbar stenosis, L3-S1. POSTOPERATIVE DIAGNOSES  1. Degenerative spondylolisthesis, L3-L4. 2.  Lumbar stenosis, L3-S1. PROCEDURES PERFORMED  1. Lumbar decompression, L3-S1, with bilateral exploration and decompression of the L3 and L4 nerve roots and the left L5 and S1 nerve roots with foraminotomy and partial facetectomy. 2.  Polaris 5.5 mm open variable angle instrumentation, L3-L4. 3.  Bilateral posterolateral spinal fusion L3-L4. 4.  Local bone graft, bone bank bone graft, and autologous growth factor. SURGEON:  Geetha Rios MD    ASSISTANT:  Cesia Franks. ANESTHESIA:  General.    IMPLANTS:     FINDINGS  1. The patient had a degenerative spondylolisthesis L3-L4 with severe central subarticular stenosis. 2.  The patient had previously undergone left L5-S1 laminotomy. There is residual left L5-S1 foraminal stenosis and left S1 subarticular stenosis. PROCEDURE:  Under general anesthesia, the patient in prone position on the Sunny frame, peripheral nerves padded, back prepped and draped in sterile fashion, a midline incision made from L3 to L4. The muscles were subperiosteally exposed laterally to the transverse processes. Radiograph taken for localization and position. The inferior portion of the L2 lamina, L3 lamina, L4 lamina were removal Leksell and Kerrison rongeurs. Decompression wide to the L3-L4 pedicles and the respective L3 and L4 nerve roots were decompressed bilaterally around the pedicle into the foramen. We performed a foraminotomy and partial facetectomy. At the end of decompression ball probe could be placed around the pedicles into the foramen without nerve root compromise. Pedicle holes made bilaterally, L3-L4, under direct visual control. Holes , felt to be within bone. Metallic markers placed. Radiographs obtained were satisfactory. Beginning first on the left-hand side, posterolateral elements at L3-L4 decorticated with  drill, followed by placement of bone graft and pedicle screws. In a similar fashion, right hand side decorticated, bone grafted, and instrumented. The medial pedicle was palpated. hardware. Permanent PA and lateral radiographs obtained were satisfactory. Rods placed bilaterally followed by set screws, which were tightened and torqued, 120 inch pounds  Final radiographs obtained were satisfactory. Nerve roots again evaluated. Gelfoam placed over the exposed dura. Bone graft placed about the rods. Incision was then extended to S1. The left paraspinal muscles were subperiosteally exposed L5-S1. Margin of previous laminotomy on the left with a curet and then the left L5 hemilamina and superior portion of the left S1 lamina were removed with Leksell and Kerrison rongeurs. Decompression was widened to the L5 and S1 pedicles on the left and respective left L5 and S1 nerve roots decompressed around the pedicles into the foramen and performed foraminotomy and partial facetectomy. Gelfoam placed over the exposed dura and the wound closed in layers with powdered vancomycin in deep subcutaneous tissue. Wound dressed. Patient was awakened and taken to recovery room in satisfactory condition without complication. ESTIMATED BLOOD LOSS:  900 mL. The patient received 375 mL Cell Saver blood. CRYSTALLOID FLUID:  1000 mL. URINE OUTPUT:  205 mL. SPECIMENS REMOVED:  None. TUBES AND DRAINS:  None. COUNTS:  Needle and sponge count correct. COMPLICATIONS:  None. At the time of dictation, the patient is now awakened sufficiently to test motor sensation.       MD ALONZO Pablo / [place]  D: 02/14/2018 11:35     T: 02/14/2018 14:34  JOB #: [id number]  CC: Andrew Ireland MD

## 2018-02-14 NOTE — H&P
Day of Surgery Update:  Elidia Donovan was seen and examined. History and physical has been reviewed. The patient has been examined.  There have been no significant clinical changes since the completion of the originally dated History and Physical.    Signed By: Nora Gill MD     February 14, 2018 7:35 AM

## 2018-02-14 NOTE — ROUTINE PROCESS
1510 TRANSFER - IN REPORT:    Verbal report received from [facility] Deb[facility] RN(name) on Megan Lemons  being received from [facility]) for routine progression of care      Report consisted of patients Situation, Background, Assessment and   Recommendations(SBAR). Information from the following report(s) SBAR, Kardex and MAR was reviewed with the receiving nurse. Opportunity for questions and clarification was provided. Assessment completed upon patients arrival to unit and care assumed.

## 2018-02-15 ENCOUNTER — APPOINTMENT (OUTPATIENT)
Dept: CT IMAGING | Age: 63
DRG: 460 | End: 2018-02-15
Attending: HOSPITALIST
Payer: MEDICARE

## 2018-02-15 LAB
ATRIAL RATE: 129 BPM
CALCULATED P AXIS, ECG09: 32 DEGREES
CALCULATED R AXIS, ECG10: -19 DEGREES
CALCULATED T AXIS, ECG11: -16 DEGREES
CREAT SERPL-MCNC: 1.02 MG/DL (ref 0.6–1.3)
DIAGNOSIS, 93000: NORMAL
GLUCOSE BLD STRIP.AUTO-MCNC: 153 MG/DL (ref 70–110)
GLUCOSE BLD STRIP.AUTO-MCNC: 195 MG/DL (ref 70–110)
GLUCOSE BLD STRIP.AUTO-MCNC: 195 MG/DL (ref 70–110)
GLUCOSE BLD STRIP.AUTO-MCNC: 199 MG/DL (ref 70–110)
HBA1C MFR BLD: 6.7 % (ref 4.2–5.6)
HCT VFR BLD AUTO: 42.4 % (ref 36–48)
HGB BLD-MCNC: 14.8 G/DL (ref 13–16)
P-R INTERVAL, ECG05: 156 MS
Q-T INTERVAL, ECG07: 288 MS
QRS DURATION, ECG06: 84 MS
QTC CALCULATION (BEZET), ECG08: 421 MS
VENTRICULAR RATE, ECG03: 129 BPM

## 2018-02-15 PROCEDURE — 74011636637 HC RX REV CODE- 636/637: Performed by: HOSPITALIST

## 2018-02-15 PROCEDURE — 74011250636 HC RX REV CODE- 250/636: Performed by: PHYSICIAN ASSISTANT

## 2018-02-15 PROCEDURE — 82962 GLUCOSE BLOOD TEST: CPT

## 2018-02-15 PROCEDURE — 74011000258 HC RX REV CODE- 258: Performed by: ORTHOPAEDIC SURGERY

## 2018-02-15 PROCEDURE — 85018 HEMOGLOBIN: CPT | Performed by: NURSE PRACTITIONER

## 2018-02-15 PROCEDURE — 74011250636 HC RX REV CODE- 250/636: Performed by: FAMILY MEDICINE

## 2018-02-15 PROCEDURE — 74011250636 HC RX REV CODE- 250/636: Performed by: ORTHOPAEDIC SURGERY

## 2018-02-15 PROCEDURE — 71275 CT ANGIOGRAPHY CHEST: CPT

## 2018-02-15 PROCEDURE — 74011250637 HC RX REV CODE- 250/637: Performed by: NURSE PRACTITIONER

## 2018-02-15 PROCEDURE — 65270000029 HC RM PRIVATE

## 2018-02-15 PROCEDURE — 77030020256 HC SOL INJ NACL 0.9%  500ML

## 2018-02-15 PROCEDURE — 97530 THERAPEUTIC ACTIVITIES: CPT

## 2018-02-15 PROCEDURE — 74011250637 HC RX REV CODE- 250/637: Performed by: ORTHOPAEDIC SURGERY

## 2018-02-15 PROCEDURE — 74011636320 HC RX REV CODE- 636/320: Performed by: ORTHOPAEDIC SURGERY

## 2018-02-15 PROCEDURE — 93005 ELECTROCARDIOGRAM TRACING: CPT

## 2018-02-15 PROCEDURE — 82565 ASSAY OF CREATININE: CPT | Performed by: HOSPITALIST

## 2018-02-15 PROCEDURE — 74011000250 HC RX REV CODE- 250: Performed by: ORTHOPAEDIC SURGERY

## 2018-02-15 PROCEDURE — 77010033678 HC OXYGEN DAILY

## 2018-02-15 RX ORDER — SODIUM CHLORIDE 9 MG/ML
100 INJECTION, SOLUTION INTRAVENOUS
Status: COMPLETED | OUTPATIENT
Start: 2018-02-15 | End: 2018-02-15

## 2018-02-15 RX ADMIN — OXYCODONE HYDROCHLORIDE AND ACETAMINOPHEN 2 TABLET: 10; 325 TABLET ORAL at 14:51

## 2018-02-15 RX ADMIN — INSULIN LISPRO 2 UNITS: 100 INJECTION, SOLUTION INTRAVENOUS; SUBCUTANEOUS at 12:03

## 2018-02-15 RX ADMIN — SODIUM CHLORIDE 95 ML: 900 INJECTION, SOLUTION INTRAVENOUS at 11:26

## 2018-02-15 RX ADMIN — MORPHINE SULFATE 10 MG: 10 INJECTION INTRAMUSCULAR; INTRAVENOUS; SUBCUTANEOUS at 00:26

## 2018-02-15 RX ADMIN — OXYCODONE HYDROCHLORIDE AND ACETAMINOPHEN 2 TABLET: 10; 325 TABLET ORAL at 07:40

## 2018-02-15 RX ADMIN — WATER 1 G: 1 INJECTION INTRAMUSCULAR; INTRAVENOUS; SUBCUTANEOUS at 02:15

## 2018-02-15 RX ADMIN — SODIUM CHLORIDE AND POTASSIUM CHLORIDE: 9; 1.49 INJECTION, SOLUTION INTRAVENOUS at 10:42

## 2018-02-15 RX ADMIN — WATER 1 G: 1 INJECTION INTRAMUSCULAR; INTRAVENOUS; SUBCUTANEOUS at 09:42

## 2018-02-15 RX ADMIN — ONDANSETRON 4 MG: 2 INJECTION INTRAMUSCULAR; INTRAVENOUS at 00:23

## 2018-02-15 RX ADMIN — SODIUM CHLORIDE AND POTASSIUM CHLORIDE: 9; 1.49 INJECTION, SOLUTION INTRAVENOUS at 21:02

## 2018-02-15 RX ADMIN — LISINOPRIL 20 MG: 20 TABLET ORAL at 09:42

## 2018-02-15 RX ADMIN — Medication 10 ML: at 17:14

## 2018-02-15 RX ADMIN — OXYCODONE HYDROCHLORIDE AND ACETAMINOPHEN 2 TABLET: 10; 325 TABLET ORAL at 21:51

## 2018-02-15 RX ADMIN — ATORVASTATIN CALCIUM 40 MG: 40 TABLET, FILM COATED ORAL at 21:51

## 2018-02-15 RX ADMIN — INSULIN LISPRO 2 UNITS: 100 INJECTION, SOLUTION INTRAVENOUS; SUBCUTANEOUS at 17:13

## 2018-02-15 RX ADMIN — DOCUSATE SODIUM 100 MG: 100 CAPSULE, LIQUID FILLED ORAL at 09:42

## 2018-02-15 RX ADMIN — SODIUM CHLORIDE AND POTASSIUM CHLORIDE: 9; 1.49 INJECTION, SOLUTION INTRAVENOUS at 00:00

## 2018-02-15 RX ADMIN — Medication 10 ML: at 21:07

## 2018-02-15 RX ADMIN — INSULIN LISPRO 2 UNITS: 100 INJECTION, SOLUTION INTRAVENOUS; SUBCUTANEOUS at 21:52

## 2018-02-15 RX ADMIN — DIAZEPAM 10 MG: 5 TABLET ORAL at 02:21

## 2018-02-15 RX ADMIN — IOPAMIDOL 70 ML: 755 INJECTION, SOLUTION INTRAVENOUS at 11:26

## 2018-02-15 RX ADMIN — INSULIN LISPRO 2 UNITS: 100 INJECTION, SOLUTION INTRAVENOUS; SUBCUTANEOUS at 09:42

## 2018-02-15 RX ADMIN — ATORVASTATIN CALCIUM 40 MG: 40 TABLET, FILM COATED ORAL at 00:48

## 2018-02-15 RX ADMIN — DOCUSATE SODIUM 100 MG: 100 CAPSULE, LIQUID FILLED ORAL at 17:13

## 2018-02-15 RX ADMIN — SODIUM CHLORIDE 250 ML: 900 INJECTION, SOLUTION INTRAVENOUS at 00:30

## 2018-02-15 NOTE — PROGRESS NOTES
Problem: Mobility Impaired (Adult and Pediatric)  Goal: *Acute Goals and Plan of Care (Insert Text)  Physical Therapy Goals  Initiated 2/14/2018 and to be accomplished within 7 day(s) following back precautions   1. Patient will move from supine to sit and sit to supine , scoot up and down and roll side to side in bed with modified independence. 2.  Patient will transfer from bed to chair and chair to bed with modified independence using the least restrictive device. 3.  Patient will perform sit to stand with modified independence. 4.  Patient will ambulate with modified independence for 300 feet with the least restrictive device. 5.  Patient will ascend/descend 15 stairs with  handrail(s) with modified independence to egress home . Outcome: Progressing Towards Goal  physical Therapy TREATMENT    Patient: Triny Gant (32 y.o. male)  Date: 2/15/2018  Diagnosis: SPONDYLOLISTHESIS STENOSIS M43.16 M48.061  Lumbar stenosis Lumbar stenosis  Procedure(s) (LRB):  DECOMPRESSION FUSION POLARIS L3-4, DECOMPRESSION LEFT L5-S1 (N/A) 1 Day Post-Op  Precautions: Fall, Back  Chart, physical therapy assessment, plan of care and goals were reviewed. PLOF: I with adl's and ambulation without assistive device     ASSESSMENT:  Patient in bed when PT arrived and agreed to therapy. Patient moved to sitting at edge of bed then donned back brace with mod A of one. Sit to stand and ambulated 40 feet x2 with RW and brace. Patient then sat on bsc over toilet and then came out and sat in chair pain reported 5/10 pre and 4/10 post and feeling better after walking. Patient educated  On safety and mobility needs reinforcement.    Progression toward goals:        Improving appropriately and progressing toward goals       x Improving slowly and progressing toward goals        Not making progress toward goals and plan of care will be adjusted     PLAN:  Patient continues to benefit from skilled intervention to address the above impairments. Continue treatment per established plan of care. Discharge Recommendations:  Home Health vs  None depends on progress   Further Equipment Recommendations for Discharge:  rolling walker     SUBJECTIVE:   Patient stated .    OBJECTIVE DATA SUMMARY:   Critical Behavior:  G codes: Mobility  Current  CJ= 20-39%   Goal  CI= 1-19%. The severity rating is based on the Other clinical observation  Neurologic State: Alert  Orientation Level: Oriented X4  Cognition: Follows commands  Safety/Judgement: Awareness of environment, Fall prevention  Functional Mobility Training:  Bed Mobility:  Rolling: Contact guard assistance;Minimum assistance; Additional time;Assist x1  Supine to Sit: Minimum assistance;Contact guard assistance; Additional time;Assist x1  Sit to Supine:  (left in chair )  Transfers:  Sit to Stand: Contact guard assistance;Minimum assistance; Additional time;Assist x1  Stand to Sit: Contact guard assistance;Minimum assistance;Assist x1;Additional time  Balance:  Sitting: Impaired  Sitting - Static: Good (unsupported)  Sitting - Dynamic: Fair (occasional)  Standing: Impaired  Standing - Static: Fair  Standing - Dynamic : Fair  Ambulation/Gait Training:  Distance (ft): 40 Feet (ft) (x2)  Assistive Device: Walker, rolling;Brace/Splint  Ambulation - Level of Assistance: Contact guard assistance; Additional time  Gait Abnormalities: Antalgic; Step to gait  Base of Support: Center of gravity altered  Speed/Ivy: Delayed  Step Length: Left shortened;Right shortened  Interventions: Safety awareness training;Verbal cues; Visual/Demos  Neuro Re-Education:  Upright posture in standing    Vital Signs  Temp: 98.2 °F (36.8 °C)     Pulse (Heart Rate): (!) 130 (notified nurse)     BP: 117/77     Resp Rate: 18     O2 Sat (%): 94 %  Pain:  Pre treatment pain level:5/10  Post treatment pain level:4/10  Pain Scale 1: Numeric (0 - 10)  Pain Intensity 1: 5  Pain Location 1: Back  Pain Orientation 1: Lower  Pain Description 1: Aching  Pain Intervention(s) 1: Medication (see MAR)  Activity Tolerance:   Fair      After treatment:   Patient left in no apparent distress sitting up in chairx  Patient left in no apparent distress in bed  Call bell left within reach x  Nursing notifiedx  Caregiver presentx  Bed alarm activated  Zollie Cooks, PT   Time Calculation: 22 mins

## 2018-02-15 NOTE — ROUTINE PROCESS
2032-The patient is alert and oriented x 4. There is no apparent signs of distress. The patient is in stable condition. The patient was encouraged to use her ICS. Pedal pulses are intact. The patient can wiggle his toes. The patient had a moderate amount of drainage. The dressing was reinforced. Capillary refill <3 seconds. The patient has no complaints. Call-bell within reach. 2230-Wife at the bedside. The patient was encouraged to use his ICS. No apparent signs of distress. 2310-Dr. Goran EAGLE made aware of the patient's heart rate of 120-133. The patient is asymptomatic. Received order for 250 cc bolus. 0036-Patient awake in bed. The patient is voiding.    0222-The patient is in stable condition. Dressing reinforced do to bloody drainage. 0530-The patient is asymptomatic. Made MD aware of the elevated heart rate between 120-133. No new orders. 0812-Made AFIA walls aware of reinforced dressing and drainage.

## 2018-02-15 NOTE — PROGRESS NOTES
Problem: Falls - Risk of  Goal: *Absence of Falls  Document Flaco Fall Risk and appropriate interventions in the flowsheet.    Outcome: Progressing Towards Goal  Fall Risk Interventions:  Mobility Interventions: Patient to call before getting OOB         Medication Interventions: Patient to call before getting OOB    Elimination Interventions: Call light in reach    History of Falls Interventions: Room close to nurse's station

## 2018-02-15 NOTE — PROGRESS NOTES
Shannan   Discharge Planning/ Assessment    Reasons for Intervention: Initial discharge planning interview. Face sheet data reviewed with pt. All information confirmed as correct. Pt with Medicare A&B primary,  secondary. PCP is Dr. Lilly Shen. Pt lives with his wife and designates her as his emergency contact and permits to sharing discharge information with her. He states PTA he was independent with ambulation and adls. No DME in the home. Pt has used home health in the past but doesn't remember the name of the agency. Pt will need a new walker. Anticipated discharge plan is home with home health (lives in [place]).      High Risk Criteria  [] Yes  [x]No   Physician Referral  [] Yes  []No        Date    Nursing Referral  [] Yes  []No        Date    Patient/Family Request  [] Yes  []No        Date       Resources:    Medicare  [x] Yes  []No   Medicaid  [] Yes  []No   No Resources  [] Yes  []No   Private Insurance  [x] Yes  []No    Name/Phone Number    Other  [] Yes  []No        (i.e. Workman's Comp)         Prior Services:    Prior Services  [x] Yes  []No   Home Health  [x] Yes  []No   [address]  [] Yes  []No        Number of [address]  [] Yes  []No       Meals on Wheels  [] Yes  []No   Office on Aging  [] Yes  []No   Transportation Services  [] Yes  []No   Nursing Home  [] Yes  []No        Nursing Home Name    [address]  [] Yes  []No        [address] Name    Other       Information Source:      Information obtained from  [x] Patient  [] Parent   [] [address]  [] Child  [] Spouse   [] Significant Other/Partner   [] Friend      [] EMS    [] Nursing Home Chart          [] Other:   Chart Review  [x] Yes  []No     Family/Support System:    Patient lives with  [] Alone    [x] Spouse   [] Significant Other  [] Children  [] Caretaker   [] Parent  [] Sibling     [] Other       Other Support System:    Is the patient responsible for care of others  [] Yes  [x]No   Information of person caring for patient on  discharge    Managers financial affairs independently  [x] Yes  []No   If no, explain:      Status Prior to Admission:    Mental Status  [x] Awake  [x] Alert  [x] Oriented  [] Quiet/Calm [] Lethargic/Sedated   [] Disoriented  [] Restless/Anxious  [] Combative   Personal Care  [] Dependent  [x] [address]  [] Requires Assistance   Meal Preparation Ability  [x] Independent   [] Standby Assistance   [] Minimal Assistance   [] Moderate Assistance  [] Maximum Assistance     [] Total Assistance   Chores  [x] Independent with Chores   [] [address] [place] Resident   [] Requires Assistance   Bowel/Bladder  [] Continent  [] Catheter  [] Incontinent  [] Ostomy Self-Care    [] Urine Diversion Self-Care  [] Maximum Assistance     [] Total Assistance   Number of Persons needed for assistance    DME at home  [] [address]  [] Ashish Knowles   [] Commode    [] Bathroom/Grab Bars  [] Hospital Bed  [] Nebulizer  [] Oxygen           [] Raised Toilet Seat  [] Shower Chair  [] Side Rails for Bed   [] Tub Transfer Bench   [] Durwin Juleee  [] Anabel Goldstein      [] Other:   Vendor      Treatment Presently Receiving:    Current Treatments  [] Chemotherapy  [] Dialysis  [] Insulin  [] IVAB [] IVF   [] O2  [] PCA   [] PT   [] RT   [] Tube Feedings   [] Wound Care     Psychosocial Evaluation:    Verbalized Knowledge of Disease Process  [] Patient  []Family   Coping with Disease Process  [] Patient  []Family   Requires Further Counseling Coping with Disease Process  [] Patient  []Family     Identified Projected Needs:    Home Health Aid  [] Yes  []No   Transportation  [] Yes  []No   Education  [] Yes  []No        Specific Education     Financial Counseling  [] Yes  []No   Inability to Care for Self/Will Require 24 hour care  [] Yes  []No   Pain Management  [] Yes  []No   Home Infusion Therapy  [] Yes  []No   Oxygen Therapy  [] Yes  []No   DME  [x] Yes  []No   Long Term Care Placement  [] Yes  []No   Rehab  [x] Yes  []No   Physical Therapy  [x] Yes  []No   Needs Anticipated At This Time  [x] Yes  []No     Intra-Hospital Referral:    [facility]  [] Yes  []No     [] Yes  []No   Patient Representative  [] Yes  []No   Staff for Teaching Needs  [] Yes  []No   Specialty Teaching Needs     Diabetic Educator  [] Yes  []No   Referral for Diabetic Educator Needed  [] Yes  []No  If Yes, place order for Nutritionist or Diabetic Consult     Tentative Discharge Plan:    Home with No Services  [x] Yes  []No   Home with Home Health Follow-up  [x] Yes  []No        If Yes, specify type    [address]  [] Yes  []No        If Yes, specify type    Meals on Wheels  [] Yes  []No   Office of Aging  [] Yes  []No   NHP  [] Yes  []No   Return to the [employer]  [] Yes  []No   Rehab Therapy  [x] Yes  []No   Acute Rehab  [x] Yes  []No   Subacute Rehab  [] Yes  []No   Private Care  [] Yes  []No   Substance Abuse Referral  [] Yes  []No   Transportation  [] Yes  []No   Chore Service  [] Yes  []No   Inpatient Hospice  [] Yes  []No   OP RT  [] Yes  [] No   OP Hemo  [] Yes  [] No   OP PT  [] Yes  []No   Support Group  [] Yes  []No   Reach to Recovery  [] Yes  []No   OP Oncology Clinic  [] Yes  []No   Clinic Appointment  [] Yes  []No   DME  [x] Yes  []No   Comments    Name of D/C Planner or  Given to Patient or [address] [place] [address], ALOK Vega [address]  Ph: [phone]  Pager: [phone]   Phone Number         Extension    Date 2/15/18   Time    If you are discharged home, whom do you designate to participate in your discharge plan and receive any information needed?      Enter name of Ninoska Strong- wife        Phone # of designee [phone]        Address of designee         Updated         Patient refused to designate any           individual

## 2018-02-15 NOTE — PROGRESS NOTES
Problem: Pain  Goal: *PALLIATIVE CARE:  Alleviation of Pain  Outcome: Resolved/Met Date Met: 02/15/18  Pt not palliative

## 2018-02-15 NOTE — PROGRESS NOTES
Problem: Mobility Impaired (Adult and Pediatric)  Goal: *Acute Goals and Plan of Care (Insert Text)  Physical Therapy Goals  Initiated 2/14/2018 and to be accomplished within 7 day(s) following back precautions   1. Patient will move from supine to sit and sit to supine , scoot up and down and roll side to side in bed with modified independence. 2.  Patient will transfer from bed to chair and chair to bed with modified independence using the least restrictive device. 3.  Patient will perform sit to stand with modified independence. 4.  Patient will ambulate with modified independence for 300 feet with the least restrictive device. 5.  Patient will ascend/descend 15 stairs with  handrail(s) with modified independence to egress home . Outcome: Progressing Towards Goal  physical Therapy TREATMENT    Patient: Roosevelt Irene (72 y.o. male)  Date: 2/15/2018  Diagnosis: SPONDYLOLISTHESIS STENOSIS M43.16 M48.061  Lumbar stenosis Lumbar stenosis  Procedure(s) (LRB):  DECOMPRESSION FUSION POLARIS L3-4, DECOMPRESSION LEFT L5-S1 (N/A) 1 Day Post-Op  Precautions: Fall, Back  Chart, physical therapy assessment, plan of care and goals were reviewed. PLOF I with adl's and ambulation without assistive device. ASSESSMENT:  Patient in bed and just got back from CT scan. Agreed to work with therapy. Patient  Completed transfer to sitting and donned back brace with min A. Patient reports pain 5/10  Pre and post and creeping up notified nursing need for medication. Patient ambulating with rolling walker and ascended and descended 4 steps with cga. Education on proper technique on stairs, pain control and  Safety needs reinforcement.   .    Progression toward goals:  []      Improving appropriately and progressing toward goals  [x]      Improving slowly and progressing toward goals  []      Not making progress toward goals and plan of care will be adjusted     PLAN:  Patient continues to benefit from skilled intervention to address the above impairments. Continue treatment per established plan of care. Discharge Recommendations:  Home Health and To Be Determined  Further Equipment Recommendations for Discharge:  rolling walker notified Janeen, Ortho  Coordinator,  of need for order. SUBJECTIVE:   Patient stated .    OBJECTIVE DATA SUMMARY:   Critical Behavior:  G codes: Mobility  Current  CJ= 20-39%   Goal  CI= 1-19%. The severity rating is based on tclinical observation and judgement. Other clinical observation and judgement. Neurologic State: Alert  Orientation Level: Oriented X4  Cognition: Follows commands  Safety/Judgement: Awareness of environment, Fall prevention  Functional Mobility Training:  Bed Mobility:  Rolling: Contact guard assistance;Minimum assistance; Additional time;Assist x1  Supine to Sit: Contact guard assistance;Minimum assistance; Additional time;Assist x1  Sit to Supine: Contact guard assistance;Minimum assistance; Additional time;Assist x1  Transfers:  Sit to Stand: Contact guard assistance;Stand-by asssistance  Stand to Sit: Stand-by asssistance;Contact guard assistance  Balance:  Sitting: Impaired  Sitting - Static: Good (unsupported)  Sitting - Dynamic: Good (unsupported)  Standing: Impaired  Standing - Static: Fair;Good  Standing - Dynamic : Fair  Ambulation/Gait Training:  Distance (ft): 50 Feet (ft) (x2)  Assistive Device: Brace/Splint; Walker, rolling  Ambulation - Level of Assistance: Stand-by asssistance; Additional time  Gait Abnormalities: Antalgic; Step to gait  Base of Support: Center of gravity altered  Speed/Ivy: Delayed  Step Length: Left shortened;Right shortened  Interventions: Safety awareness training;Verbal cues; Visual/Demos  Stairs:  Number of Stairs Trained: 4  Stairs - Level of Assistance: Contact guard assistance; Additional time;Assist X1  Rail Use: Left     Pain:  Pain Scale 1: Numeric (0 - 10)  Pain Intensity 1: 5  Pain Location 1: Back  Pain Orientation 1: Lower  Pain Description 1: Throbbing  Pain Intervention(s) 1: Medication (see MAR)  Activity Tolerance:   Fair   Please refer to the flowsheet for vital signs taken during this treatment.   After treatment:   [] Patient left in no apparent distress sitting up in chair  [x] Patient left in no apparent distress in bed  [x] Call bell left within reach  [x] Nursing notified  [x] Caregiver present  [] Bed alarm activated      Faviola Celeste PT   Time Calculation: 24 mins

## 2018-02-15 NOTE — PROGRESS NOTES
Patient has designated ___wife_____ to participate in his/her discharge plan and to receive any needed information.      Name: Heidegabbie Jaleel  Address:  Phone number: [phone]

## 2018-02-15 NOTE — PROGRESS NOTES
[facility]  Hospitalist Division        Inpatient Daily Progress Note    Daily progress Note    Patient: Roxanna Welsh MRN: [id number]  CSN: [id number]    YOB: 1955  Age: 57 y.o. Sex: male    DOA: 2/14/2018 LOS:  LOS: 1 day                    Chief Complaint:  Lumbar stenosis       Subjective:      Sitting up in chair. Denies shortness of breath or calf pain. + flatus. Objective:      Visit Vitals    /74 (BP 1 Location: Right arm, BP Patient Position: At rest)    Pulse (!) 124    Temp 98 °F (36.7 °C)    Resp 18    Ht 5' 9\" (1.753 m)    Wt 104.3 kg (230 lb)    SpO2 94%    BMI 33.97 kg/m2           Physical Exam:  General appearance: alert, cooperative, no distress, appears stated age  Lungs: clear to auscultation bilaterally, no wheezes or rhonchi   Heart: regular rate and rhythm, S1, S2 normal, no murmur, click, rub or gallop  Abdomen: soft, non tender, non distended. Normoactive bowel sounds  Extremities: extremities normal, atraumatic, no cyanosis or edema  Skin: Skin color, texture, turgor normal. No rashes or lesions. Lumbar dressing CDI  Neurologic: Grossly normal  PSY: Mood and affect normal, appropriately behaved      Intake and Output:  Current Shift:  02/15 0701 - 02/15 1900  In: 1206.3 [P.O.:420; I.V.:786.3]  Out: 800 [Urine:800]  Last three shifts:  02/13 1901 - 02/15 0700  In: 4705.4 [P.O.:1500;  I.V.:2830.4]  Out: 2965 [Urine:1955]    Recent Results (from the past 24 hour(s))   GLUCOSE, POC    Collection Time: 02/14/18  5:17 PM   Result Value Ref Range    Glucose (POC) 151 (H) 70 - 110 mg/dL   HGB & HCT    Collection Time: 02/14/18  7:15 PM   Result Value Ref Range    HGB 16.0 13.0 - 16.0 g/dL    HCT 46.2 36.0 - 48.0 %   HEMOGLOBIN A1C W/O EAG    Collection Time: 02/14/18  8:18 PM   Result Value Ref Range    Hemoglobin A1c 6.7 (H) 4.2 - 5.6 %   GLUCOSE, POC    Collection Time: 02/14/18  9:06 PM   Result Value Ref Range    Glucose (POC) 148 (H) 70 - 110 mg/dL   GLUCOSE, POC    Collection Time: 02/15/18  6:03 AM   Result Value Ref Range    Glucose (POC) 153 (H) 70 - 110 mg/dL   CREATININE    Collection Time: 02/15/18 10:05 AM   Result Value Ref Range    Creatinine 1.02 0.6 - 1.3 MG/DL    GFR est AA >60 >60 ml/min/1.73m2    GFR est non-AA >60 >60 ml/min/1.73m2   EKG, 12 LEAD, SUBSEQUENT    Collection Time: 02/15/18 10:23 AM   Result Value Ref Range    Ventricular Rate 129 BPM    Atrial Rate 129 BPM    P-R Interval 156 ms    QRS Duration 84 ms    Q-T Interval 288 ms    QTC Calculation (Bezet) 421 ms    Calculated P Axis 32 degrees    Calculated R Axis -19 degrees    Calculated T Axis -16 degrees    Diagnosis       Sinus tachycardia  Minimal voltage criteria for LVH, may be normal variant  Possible Anterior infarct , age undetermined  Abnormal ECG  When compared with ECG of 19-JUN-2014 12:31,  No significant change was found     GLUCOSE, POC    Collection Time: 02/15/18 11:14 AM   Result Value Ref Range    Glucose (POC) 195 (H) 70 - 110 mg/dL           Lab Results   Component Value Date/Time    Glucose 159 (H) 06/19/2014 06:20 AM        Assessment/Plan:     Patient Active Problem List   Diagnosis Code    Cervical stenosis of spine M48.02    HTN (hypertension) I10    Type 2 diabetes mellitus ([facility] [address] E11.9    CAD (coronary artery disease) I25.10    Hyperlipidemia E78.5    Lumbar stenosis M48.061    ROHAN (obstructive sleep apnea) G47.33       A/P:  A/P:  - S/p decompression fusion polaris L3-L4, decompression Left L5-S1  - Persistent tachycardia post-op: CTA Chest completed- report pending   - HTN: lisinopril  - DM-II- SSI, diabetic diet  - Hypercholesterolemia: atorvastatin  - Bowel regimen: colace BID  - DVT prophylaxis: SALEEM Rockwell  [address] [facility]  Hospitalist Division  Pager:  [phone]  Office:  [phone]

## 2018-02-15 NOTE — PROGRESS NOTES
Problem: Falls - Risk of  Goal: *Absence of Falls  Document Flaco Fall Risk and appropriate interventions in the flowsheet.    Outcome: Progressing Towards Goal  Fall Risk Interventions:  Mobility Interventions: Communicate number of staff needed for ambulation/transfer, Patient to call before getting OOB, PT Consult for mobility concerns, PT Consult for assist device competence, Utilize walker, cane, or other assitive device         Medication Interventions: Teach patient to arise slowly, Patient to call before getting OOB    Elimination Interventions: Call light in reach, Patient to call for help with toileting needs, Urinal in reach, Toileting schedule/hourly rounds    History of Falls Interventions: Room close to nurse's station

## 2018-02-15 NOTE — DISCHARGE INSTRUCTIONS
DISCHARGE SUMMARY from Nurse    PATIENT INSTRUCTIONS:    After general anesthesia or intravenous sedation, for 24 hours or while taking prescription Narcotics:  · Limit your activities  · Do not drive and operate hazardous machinery  · Do not make important personal or business decisions  · Do  not drink alcoholic beverages  · If you have not urinated within 8 hours after discharge, please contact your surgeon on call. Report the following to your surgeon:  · Excessive pain, swelling, redness or odor of or around the surgical area  · Temperature over 100.5  · Nausea and vomiting lasting longer than 4 hours or if unable to take medications  · Any signs of decreased circulation or nerve impairment to extremity: change in color, persistent  numbness, tingling, coldness or increase pain  · Any questions    What to do at Home:  Recommended activity: No lifting, Driving, or Strenuous exercise until cleared by surgeon. If you experience any of the following symptoms severe pain, nausea and vomiting, fever above 100.5, bleeding or drainage from incision, shortness of breath, please follow up with Dr. Crhistine Munson. *  Please give a list of your current medications to your Primary Care Provider. *  Please update this list whenever your medications are discontinued, doses are      changed, or new medications (including over-the-counter products) are added. *  Please carry medication information at all times in case of emergency situations. These are general instructions for a healthy lifestyle:    No smoking/ No tobacco products/ Avoid exposure to second hand smoke  Surgeon General's Warning:  Quitting smoking now greatly reduces serious risk to your health.     Obesity, smoking, and sedentary lifestyle greatly increases your risk for illness    A healthy diet, regular physical exercise & weight monitoring are important for maintaining a healthy lifestyle    You may be retaining fluid if you have a history of heart failure or if you experience any of the following symptoms:  Weight gain of 3 pounds or more overnight or 5 pounds in a week, increased swelling in our hands or feet or shortness of breath while lying flat in bed. Please call your doctor as soon as you notice any of these symptoms; do not wait until your next office visit. Recognize signs and symptoms of STROKE:    F-face looks uneven    A-arms unable to move or move unevenly    S-speech slurred or non-existent    T-time-call 911 as soon as signs and symptoms begin-DO NOT go       Back to bed or wait to see if you get better-TIME IS BRAIN. Warning Signs of HEART ATTACK     Call 911 if you have these symptoms:   Chest discomfort. Most heart attacks involve discomfort in the center of the chest that lasts more than a few minutes, or that goes away and comes back. It can feel like uncomfortable pressure, squeezing, fullness, or pain.  Discomfort in other areas of the upper body. Symptoms can include pain or discomfort in one or both arms, the back, neck, jaw, or stomach.  Shortness of breath with or without chest discomfort.  Other signs may include breaking out in a cold sweat, nausea, or lightheadedness. Don't wait more than five minutes to call 911 - MINUTES MATTER! Fast action can save your life. Calling 911 is almost always the fastest way to get lifesaving treatment. Emergency Medical Services staff can begin treatment when they arrive -- up to an hour sooner than if someone gets to the hospital by car. The discharge information has been reviewed with the patient. The patient verbalized understanding. Discharge medications reviewed with the patient and appropriate educational materials and side effects teaching were provided. \Patient armband removed and shredded.   ___________________________________________________________________________________________________________________________________

## 2018-02-15 NOTE — ROUTINE PROCESS
Bedside and Verbal shift change report given to Yovany Merchant (oncoming nurse) by Angela Burgos RN (offgoing nurse). Report included the following information SBAR, Kardex and MAR.

## 2018-02-15 NOTE — PROGRESS NOTES
conducted an initial consultation and Spiritual Assessment for Saint Ferguson, who is a 57 y.o.,male. Patients Primary Language is: [place]. According to the patients EMR Rastafari Affiliation is: Other. The reason the Patient came to the hospital is:   Patient Active Problem List    Diagnosis Date Noted    Lumbar stenosis 02/14/2018    ROHAN (obstructive sleep apnea) 02/14/2018    Cervical stenosis of spine 06/18/2014    HTN (hypertension) 06/18/2014    Type 2 diabetes mellitus ([address] 06/18/2014    CAD (coronary artery disease) 06/18/2014    Hyperlipidemia 06/18/2014        The  provided the following Interventions:  Initiated a relationship of care and support. Explored issues of petrona, belief, spirituality and Methodist/ritual needs while hospitalized. Listened empathically. Provided information about Spiritual Care Services. Offered prayer and assurance of continued prayers on patient's behalf. Chart reviewed. The following outcomes were achieved:  Patient shared limited information about both their medical narrative and spiritual journey/beliefs. Patient processed feeling about current hospitalization. Patient expressed gratitude for 's visit. Assessment:  Patient does not have any Methodist/cultural needs that will affect patients preferences in health care. There are no further spiritual or Methodist issues which require intervention at this time. Plan:  Chaplains will continue to follow and will provide pastoral care on an as needed/requested basis.  recommends bedside caregivers page  on duty if patient shows signs of acute spiritual or emotional distress. Bere Sunshine M.Div.   [facility] [address]  [phone]

## 2018-02-15 NOTE — PROGRESS NOTES
Received bedside shift report from ALOK Muñoz. Pt is resting in bed, dressing reinforced again with offgoing nurse. White board updated, call bell within reach. Pt assisted to bedside commode with x2 assist. Adamant about being left in room alone for privacy. Left call bell within reach and instructed pt to call before getting up.     0822 Went into room upon CNA's request to have dressing checked, pt had assisted self back to bed with help from wife, educated pt to wait for assistance back to bed. Wife replied, \"he couldn't sit in chair any longer, had to get back to bed. How long are you here for? \" Replied that this shift lasts until 1900. CNA repositioned pt back into position of comfort. 0930 Alerted by CT that pt needs 18 gauge IV in [facility] or higher, pt currently has 20 gauge in [facility], not acceptable. Will start new IV. Pt is also not to eat or drink until after CT.     0935 Alerted by CT that pt will also need additional labs, will order. 5883 Pt getting up, sitting on side of bed with PT. AM medications administered, pt states pain has come down to a 5 out of 10, dressing retaped. Alerted pt to upcoming test, new IV, and nothing to eat or drink until after the test.     1005 Pt returned to room and placed in chair. PT reports that pt urinated while in restroom. 1110 Pt left room with transport for CTA. 1155 Pt returned to room, IV restarted. Offered PRN Percocet, pt politely declined, stating his pain is a 4 to 5 and that he will call when he needs it. 1452 Pt resting in bed, just finished working with PT, complaining of pain rated a 5 out of 10 and creeping up, so administered PRN percocet. CNA took vitals, BP slightly lower than last reading, will retake shortly. Pt is not symptomatic. 1715 Pt sitting in chair, eating dinner. Wife at bedside. Rated his pain a 4 out of 10, states he is comfortable. 1800 Pt ambulating in hallway with CNA, NAD noted.      Bedside shift change report given to JAE Bhakta (oncoming nurse) by Marianne Shepherd RN (offgoing nurse). Report included the following information SBAR, Kardex, OR Summary, Intake/Output, MAR, Recent Results and Cardiac Rhythm sinus tachy.

## 2018-02-15 NOTE — PROGRESS NOTES
Problem: Discharge Planning  Goal: *Discharge to safe environment  Outcome: Progressing Towards Goal  Home vs home with home health

## 2018-02-15 NOTE — PROGRESS NOTES
Progress Note      Post Operative Day: 1    Assessment:    1. Status post  LAMINEC/FACETECT/FORAMIN,LUMBAR [77774] (SPINE LUMBAR POSTERIOR INTERBODY FUSION (PLIF))  LAMINEC/FACETECT/FORAMIN,EACH ADDNL [02458]  IL ARTHRODESIS POSTERIOR/POSTEROLATERAL LUMBAR [44877]  SPINE FIXATION,POST,BROWNE [84671]  SPIN BONE AUTOGRFT LOCAL [50143] for SPONDYLOLISTHESIS STENOSIS M43.16 M48.061  Lumbar stenosis 2/14/2018,   Progressing. PLAN:    1. Mobilize. Continue P.T.  2. Brace  3. Guzman out this am   4. Discharge Planning home tomorrow if doing well           HPI: Ashley Martinez is a 57 y.o. male patient without new complaints status post fusion for SPONDYLOLISTHESIS STENOSIS M43.16 M48.061  Lumbar stenosis 2/14/2018. No new orthopaedic changes. Blood pressure 117/77, pulse (!) 130, temperature 98.2 °F (36.8 °C), resp. rate 18, height 5' 9\" (1.753 m), weight 104.3 kg (230 lb), SpO2 94 %. CBC w/Diff   Lab Results   Component Value Date/Time    HCT 46.2 02/14/2018 07:15 PM          Physical Assessment:  General: in no apparent distress   Extremities:  Neurovascular intact. Dressing:  Dressings reinforced with bloody drainage   DVT Exam:   No exam evidence to suggest DVT.  Compartments soft and NT.               Chetan Cid PA-C  2/15/2018  Office [phone] Tvit [phone]

## 2018-02-16 VITALS
HEART RATE: 114 BPM | DIASTOLIC BLOOD PRESSURE: 64 MMHG | BODY MASS INDEX: 34.07 KG/M2 | RESPIRATION RATE: 18 BRPM | SYSTOLIC BLOOD PRESSURE: 110 MMHG | HEIGHT: 69 IN | OXYGEN SATURATION: 90 % | WEIGHT: 230 LBS | TEMPERATURE: 98.1 F

## 2018-02-16 LAB
ANION GAP SERPL CALC-SCNC: 12 MMOL/L (ref 3–18)
BUN SERPL-MCNC: 19 MG/DL (ref 7–18)
BUN/CREAT SERPL: 27 (ref 12–20)
CALCIUM SERPL-MCNC: 8.3 MG/DL (ref 8.5–10.1)
CHLORIDE SERPL-SCNC: 103 MMOL/L (ref 100–108)
CO2 SERPL-SCNC: 19 MMOL/L (ref 21–32)
CREAT SERPL-MCNC: 0.7 MG/DL (ref 0.6–1.3)
GLUCOSE BLD STRIP.AUTO-MCNC: 145 MG/DL (ref 70–110)
GLUCOSE BLD STRIP.AUTO-MCNC: 188 MG/DL (ref 70–110)
GLUCOSE SERPL-MCNC: 149 MG/DL (ref 74–99)
POTASSIUM SERPL-SCNC: 4.3 MMOL/L (ref 3.5–5.5)
SODIUM SERPL-SCNC: 134 MMOL/L (ref 136–145)
T4 FREE SERPL-MCNC: 1.7 NG/DL (ref 0.7–1.5)
TSH SERPL DL<=0.05 MIU/L-ACNC: 0.19 UIU/ML (ref 0.36–3.74)

## 2018-02-16 PROCEDURE — 74011250637 HC RX REV CODE- 250/637: Performed by: PHYSICIAN ASSISTANT

## 2018-02-16 PROCEDURE — 74011250636 HC RX REV CODE- 250/636: Performed by: ORTHOPAEDIC SURGERY

## 2018-02-16 PROCEDURE — 84443 ASSAY THYROID STIM HORMONE: CPT | Performed by: HOSPITALIST

## 2018-02-16 PROCEDURE — 97530 THERAPEUTIC ACTIVITIES: CPT

## 2018-02-16 PROCEDURE — 80048 BASIC METABOLIC PNL TOTAL CA: CPT | Performed by: HOSPITALIST

## 2018-02-16 PROCEDURE — 74011250637 HC RX REV CODE- 250/637: Performed by: NURSE PRACTITIONER

## 2018-02-16 PROCEDURE — 74011250637 HC RX REV CODE- 250/637: Performed by: ORTHOPAEDIC SURGERY

## 2018-02-16 PROCEDURE — 36415 COLL VENOUS BLD VENIPUNCTURE: CPT | Performed by: HOSPITALIST

## 2018-02-16 PROCEDURE — 77030020263 HC SOL INJ SOD CL0.9% LFCR 1000ML

## 2018-02-16 PROCEDURE — 84439 ASSAY OF FREE THYROXINE: CPT | Performed by: HOSPITALIST

## 2018-02-16 PROCEDURE — 74011636637 HC RX REV CODE- 636/637: Performed by: HOSPITALIST

## 2018-02-16 PROCEDURE — 82962 GLUCOSE BLOOD TEST: CPT

## 2018-02-16 RX ORDER — POLYETHYLENE GLYCOL 3350 17 G/17G
17 POWDER, FOR SOLUTION ORAL DAILY
Status: DISCONTINUED | OUTPATIENT
Start: 2018-02-16 | End: 2018-02-16 | Stop reason: HOSPADM

## 2018-02-16 RX ORDER — BISACODYL 5 MG
10 TABLET, DELAYED RELEASE (ENTERIC COATED) ORAL DAILY PRN
Status: DISCONTINUED | OUTPATIENT
Start: 2018-02-16 | End: 2018-02-16 | Stop reason: HOSPADM

## 2018-02-16 RX ADMIN — POLYETHYLENE GLYCOL 3350 17 G: 17 POWDER, FOR SOLUTION ORAL at 12:11

## 2018-02-16 RX ADMIN — Medication 10 ML: at 05:15

## 2018-02-16 RX ADMIN — INSULIN LISPRO 2 UNITS: 100 INJECTION, SOLUTION INTRAVENOUS; SUBCUTANEOUS at 12:11

## 2018-02-16 RX ADMIN — OXYCODONE HYDROCHLORIDE AND ACETAMINOPHEN 2 TABLET: 10; 325 TABLET ORAL at 08:40

## 2018-02-16 RX ADMIN — DOCUSATE SODIUM 100 MG: 100 CAPSULE, LIQUID FILLED ORAL at 08:41

## 2018-02-16 RX ADMIN — LISINOPRIL 20 MG: 20 TABLET ORAL at 08:41

## 2018-02-16 RX ADMIN — SODIUM CHLORIDE AND POTASSIUM CHLORIDE: 9; 1.49 INJECTION, SOLUTION INTRAVENOUS at 05:13

## 2018-02-16 NOTE — PROGRESS NOTES
2 [facility]  Hospitalist Division        Inpatient Daily Progress Note    Daily progress Note    Patient: Camilo Smiley MRN: [id number]  CSN: [id number]    YOB: 1955  Age: 57 y.o. Sex: male    DOA: 2/14/2018 LOS:  LOS: 2 days                    Chief Complaint:  Lumbar stenosis       Subjective:      Sitting up in chair. Pain well controlled. + flatus, no BM    Objective:      Visit Vitals    /64 (BP 1 Location: Left arm, BP Patient Position: At rest;Sitting)    Pulse (!) 114    Temp 98.1 °F (36.7 °C)    Resp 18    Ht 5' 9\" (1.753 m)    Wt 104.3 kg (230 lb)    SpO2 90%    BMI 33.97 kg/m2           Physical Exam:  General appearance: alert, cooperative, no distress, appears stated age  Lungs: clear to auscultation throughout   Heart: tachycardic rate and rhythm, S1, S2 normal, no murmur, click, rub or gallop  Abdomen: soft, non tender, non distended. Normoactive bowel sounds  Extremities: extremities normal, atraumatic, no cyanosis or edema  Skin: Skin color, texture, turgor normal. No rashes or lesions. Lumbar dressing CDI  Neurologic: Right 5/5, Left 5/5   PSY: Mood and affect normal, appropriately behaved      Intake and Output:  Current Shift:  02/16 0701 - 02/16 1900  In: 2437.9 [P.O.:240;  I.V.:2197.9]  Out: -   Last three shifts:  02/14 1901 - 02/16 0700  In: 5937.5 [P.O.:2520; I.V.:3417.5]  Out: 4810 [Urine:4810]    Recent Results (from the past 24 hour(s))   GLUCOSE, POC    Collection Time: 02/15/18  4:17 PM   Result Value Ref Range    Glucose (POC) 195 (H) 70 - 110 mg/dL   GLUCOSE, POC    Collection Time: 02/15/18  9:25 PM   Result Value Ref Range    Glucose (POC) 199 (H) 70 - 110 mg/dL   GLUCOSE, POC    Collection Time: 02/16/18  6:37 AM   Result Value Ref Range    Glucose (POC) 145 (H) 70 - 809 mg/dL   METABOLIC PANEL, BASIC    Collection Time: 02/16/18  9:20 AM   Result Value Ref Range    Sodium 134 (L) 136 - 145 mmol/L    Potassium 4.3 3.5 - 5.5 mmol/L    Chloride 103 100 - 108 mmol/L    CO2 19 (L) 21 - 32 mmol/L    Anion gap 12 3.0 - 18 mmol/L    Glucose 149 (H) 74 - 99 mg/dL    BUN 19 (H) 7.0 - 18 MG/DL    Creatinine 0.70 0.6 - 1.3 MG/DL    BUN/Creatinine ratio 27 (H) 12 - 20      GFR est AA >60 >60 ml/min/1.73m2    GFR est non-AA >60 >60 ml/min/1.73m2    Calcium 8.3 (L) 8.5 - 10.1 MG/DL   TSH 3RD GENERATION    Collection Time: 02/16/18  9:20 AM   Result Value Ref Range    TSH 0.19 (L) 0.36 - 3.74 uIU/mL   GLUCOSE, POC    Collection Time: 02/16/18 11:08 AM   Result Value Ref Range    Glucose (POC) 188 (H) 70 - 110 mg/dL           Lab Results   Component Value Date/Time    Glucose 149 (H) 02/16/2018 09:20 AM    Glucose 159 (H) 06/19/2014 06:20 AM        Assessment/Plan:     Patient Active Problem List   Diagnosis Code    Cervical stenosis of spine M48.02    HTN (hypertension) I10    Type 2 diabetes mellitus (HCC) E11.9    CAD (coronary artery disease) I25.10    Hyperlipidemia E78.5    Lumbar stenosis M48.061    ROHAN (obstructive sleep apnea) G47.33         A/P:  - S/p decompression fusion polaris L3-L4, decompression Left L5-S1  - Persistent tachycardia post-op: CTA Chest completed- no evidence of PE or other acute pulmonary process   - HTN: lisinopril  - DM-II- SSI, diabetic diet  - Hypercholesterolemia: atorvastatin  - Bowel regimen: colace BID  - DVT prophylaxis: SCDs     Medically stable for discharge home- advised to continue colace while taking narcotics        ATIYA Phillips-BC  [address]  Hospitalist Division  Pager:  [phone]  Office:  [phone]

## 2018-02-16 NOTE — ROUTINE PROCESS
Bedside and Verbal shift change report given to Kory Davison (oncoming nurse) by Aida Guillen RN (offgoing nurse). Report included the following information SBAR, Kardex, Intake/Output and MAR.

## 2018-02-16 NOTE — H&P
Orthopaedics    Patient without new complaints status post LAMINEC/FACETECT/FORAMIN,LUMBAR [55410] (SPINE LUMBAR POSTERIOR INTERBODY FUSION (PLIF))  LAMINEC/FACETECT/FORAMIN,EACH ADDNL [97359]  KS ARTHRODESIS POSTERIOR/POSTEROLATERAL LUMBAR [22612]  SPINE FIXATION,POST,BROWNE [07700]  SPIN BONE AUTOGRFT LOCAL [20936] for SPONDYLOLISTHESIS STENOSIS M43.16 M48.061  Lumbar stenosis 2/14/2018. Decreased l lep. Voiding  No sob/chest pain    Visit Vitals    /72 (BP 1 Location: Right arm, BP Patient Position: At rest)    Pulse (!) 113    Temp 98.1 °F (36.7 °C)    Resp 18    Ht 5' 9\" (1.753 m)    Wt 104.3 kg (230 lb)    SpO2 94%    BMI 33.97 kg/m2       CBC w/Diff    Lab Results   Component Value Date/Time    HCT 42.4 02/15/2018 01:33 PM    No results found for: BANDS, LYMPHOCYTES, MONOS, EOS, BASOS, PROMYELOCYTE, BLAST, RDW       Physical exam:  Dressing dry. AT, GS intact  Bilateral Lower Extremities. Calves soft and nontender. CTA chest neg for PE       Assessment:  Status post LAMINEC/FACETECT/FORAMIN,LUMBAR [05015] (SPINE LUMBAR POSTERIOR INTERBODY FUSION (PLIF))  LAMINEC/FACETECT/FORAMIN,EACH ADDNL [94580]  KS ARTHRODESIS POSTERIOR/POSTEROLATERAL LUMBAR [22612]  SPINE FIXATION,POST,BROWNE [75540]  SPIN BONE AUTOGRFT LOCAL [20936] for SPONDYLOLISTHESIS STENOSIS M43.16 M48.061  Lumbar stenosis 2/14/2018,  Progressing and doing well. PLAN:  Mobilize. Continue P.T. Discharge Planning.  Home later today or tomorrow with percocet and Kim Hannah MD  February 16, 2018

## 2018-02-16 NOTE — PROGRESS NOTES
Rolling walker delivered to the pt's room from the [employer]. Rererral and delivery ticket faxed to [employer] for processing.

## 2018-02-16 NOTE — PROGRESS NOTES
Received bedside shift report from Leigh Boss, RN. Pt is resting in bed, stating he just returned to bed. White board updated, call bell within reach. Dressing reinforcement clean, dry, intact. 3922 Received verbal orders from Dr. Padma Hayes to change dressing over surgical site to new 4x4, ABD pad, and possibly steri-strips, using sterile technique. 0930 Received verbal orders from [address], [address], for miralax 17g daily and dulcolax PO 10 mg PRN daily for constipation. 1119 Received verbal orders from Dr. Gibson [place] to discontinue IV fluids. 26 Pt and wife want to be discharged. Called Serena Cid, [address], stated that hospitalist group has cleared for discharge, received verbal order with readback for discharge. 80 Dressing changed using sterile technique, pt and wife educated to remove on Wednesday and to reinforce if needed. Discharge nurse at bedside to discharge. 1247 Paged Juanito Rivera to see what needs to be done for pt to receive rolling walker. [facility] states that she will get in contact with Cindi Felty to get rolling walker delivered. Pt made aware. 200 Zheng Watts at bedside, paged PT to adjust walker.

## 2018-02-16 NOTE — ROUTINE PROCESS
I have reviewed discharge instructions with the patient. The patient verbalized understanding. Waiting for walker to arrive.   Ride is at bedside

## 2018-02-16 NOTE — PROGRESS NOTES
Problem: Falls - Risk of  Goal: *Absence of Falls  Document Flaco Fall Risk and appropriate interventions in the flowsheet.    Outcome: Progressing Towards Goal  Fall Risk Interventions:  Mobility Interventions: Patient to call before getting OOB, Utilize walker, cane, or other assitive device         Medication Interventions: Patient to call before getting OOB, Teach patient to arise slowly    Elimination Interventions: Call light in reach, Patient to call for help with toileting needs    History of Falls Interventions: Evaluate medications/consider consulting pharmacy

## 2018-02-16 NOTE — DISCHARGE SUMMARY
Discharge Summary    Admit Date: 2018  Discharge Date:  18    Patient ID:   Name:  Epifanio Garcia      Age:  57 y.o.    :  1955    Admitting Diagnosis: SPONDYLOLISTHESIS STENOSIS M43.16 M48.061  Lumbar stenosis     Post Operative Day: 2    Operative Procedures:  LAMINEC/FACETECT/FORAMIN,LUMBAR [42672] (SPINE LUMBAR POSTERIOR INTERBODY FUSION (PLIF))  LAMINEC/FACETECT/FORAMIN,EACH ADDNL [57083]  CO ARTHRODESIS POSTERIOR/POSTEROLATERAL LUMBAR [81828]  SPINE FIXATION,POST,BROWNE [83316]  SPIN BONE AUTOGRFT LOCAL [50655]      Isolation Precautions:   Not required. Patient is not currently contagious. Physical Exam on Discharge:  Visit Vitals    /72 (BP 1 Location: Right arm, BP Patient Position: At rest)    Pulse (!) 113    Temp 98.1 °F (36.7 °C)    Resp 18    Ht 5' 9\" (1.753 m)    Wt 104.3 kg (230 lb)    SpO2 94%    BMI 33.97 kg/m2         General: in no apparent distress   Extremities:  Neurovascular intact    Dressing:  dry   DVT Exam:   No evidence of DVT seen on physical exam;  compartments soft and NT. Relevant labs within last 72 hours:    CBC w/Diff    Lab Results   Component Value Date/Time    HCT 42.4 02/15/2018 01:33 PM    No results found for: BANDS, LYMPHOCYTES, MONOS, EOS, BASOS, PROMYELOCYTE, BLAST, RDW       BMP   Lab Results   Component Value Date     (L) 2014    CO2 19 (L) 2014    BUN 13 2014          Coagulation   No results found for: INR, APTT           Condition at discharge: Afebrile  Ambulating  Eating, Drinking, Voiding  Stable    Current Discharge Medication List      CONTINUE these medications which have NOT CHANGED    Details   atorvastatin (LIPITOR) 40 mg tablet Take 40 mg by mouth nightly. azelastine (OPTIVAR) 0.05 % ophthalmic solution as needed. Refills: 2      canagliflozin (INVOKANA) 300 mg tab Take 300 mg by mouth Daily (before breakfast).       lisinopril (PRINIVIL, ZESTRIL) 20 mg tablet Take  by mouth daily. glipiZIDE SR (GLUCOTROL) 2.5 mg CR tablet Take 7.5 mg by mouth two (2) times a day. multivitamin (ONE A DAY) tablet Take 1 Tab by mouth daily. PCP:  Hien Oneill MD        Disposition:  Clear for discharge to home, if clear by medicine service. Follow-up in the office in  1 month with Dr. Valencia Olivo; call [phone] to schedule appointment.      Wound Care: open to air if no drainage POD 5           -Serena Cid PA-C  2/16/2018  Office [phone]  Cell [phone]

## 2018-02-16 NOTE — PROGRESS NOTES
Problem: Mobility Impaired (Adult and Pediatric)  Goal: *Acute Goals and Plan of Care (Insert Text)  Physical Therapy Goals  Initiated 2/14/2018 and to be accomplished within 7 day(s) following back precautions   1. Patient will move from supine to sit and sit to supine , scoot up and down and roll side to side in bed with modified independence. 2.  Patient will transfer from bed to chair and chair to bed with modified independence using the least restrictive device. 3.  Patient will perform sit to stand with modified independence. 4.  Patient will ambulate with modified independence for 300 feet with the least restrictive device. 5.  Patient will ascend/descend 15 stairs with  handrail(s) with modified independence to egress home . Outcome: Progressing Towards Goal  physical Therapy TREATMENT    Patient: Jessenia Mccloud (12 y.o. male)  Date: 2/16/2018  Diagnosis: SPONDYLOLISTHESIS STENOSIS M43.16 M48.061  Lumbar stenosis Lumbar stenosis  Procedure(s) (LRB):  DECOMPRESSION FUSION POLARIS L3-4, DECOMPRESSION LEFT L5-S1 (N/A) 2 Days Post-Op  Precautions: Fall, Back  Chart, physical therapy assessment, plan of care and goals were reviewed. PLOF: I with adl's and ambulation without assistive device. ASSESSMENT:   patient in bed pain 2/10 pre and post.  Left sitting in chair. Ambulating with rw with  back brace needing  Elizabeth and reinforcement to tighten support post standing verbalized understanding. Ambulation with  rw for 100 feet x2 and ascend and descend 10 steps holding railing marking time. sba    Education on  Can move in home without assistive device when feeling safe and to keep rw due to insurance not paying for another walker for 7 years verbalized understanding.      Progression toward goals:        Improving appropriately and progressing toward goalsx        Improving slowly and progressing toward goals        Not making progress toward goals and plan of care will be adjusted PLAN:  Patient continues to benefit from skilled intervention to address the above impairments. Continue treatment per established plan of care. Discharge Recommendations:  None  Further Equipment Recommendations for Discharge:  rolling walker     SUBJECTIVE:   Patient stated .    OBJECTIVE DATA SUMMARY:   Critical Behavior:  Neurologic State: Alert  Orientation Level: Oriented X4  Cognition: Appropriate decision making, Appropriate for age attention/concentration, Appropriate safety awareness, Follows commands  Safety/Judgement: Fall prevention, Awareness of environment    G CODE:Mobility [id number] Current  CJ= 20-39%   Goal  CI= 1-19%. The severity rating is based on the Other [facility] Balance Scale4/5   [facility] Standing Balance Scale4/5  0: Pt performs 25% or less of standing activity (Max assist) CN, 100% impaired. 1: Pt supports self with upper extremities but requires therapist assistance. Pt performs 25-50% of effort (Mod assist) CM, 80% to <100% impaired. 1+: Pt supports self with upper extremities but requires therapist assistance. Pt performs >50% effort. (Min assist). CL, 60% to <80% impaired. 2: Pt supports self independently with both upper extremities (walker, crutches, parallel bars). CL, 60% to <80% impaired. 2+: Pt support self independently with 1 upper extremity (cane, crutch, 1 parallel bar). CK, 40% to <60% impaired. 3: Pt stands without upper extremity support for up to 30 seconds. CK, 40% to <60% impaired. 3+: Pt stands without upper extremity support for 30 seconds or greater. CJ, 20% to <40% impaired. 4: Pt independently moves and returns center of gravity 1-2 inches in one plane. CJ, 20% to <40% impaired. 4+: Pt independently moves and returns center of gravity 1-2 inches in multiple planes. CI, 1% to <20% impaired. 5: Pt independently moves and returns center of gravity in all planes greater than 2 inches. CH, 0% impaired.   Functional Mobility Training:  Bed Mobility:  Rolling: Supervision;Stand-by asssistance; Additional time;Assist x1  Supine to Sit: Supervision;Stand-by asssistance; Additional time;Assist x1  Sit to Supine:  (let in chair )  Transfers:  Sit to Stand: Stand-by asssistance; Additional time;Assist x1  Stand to Sit: Stand-by asssistance  Balance:  Sitting: Intact  Sitting - Static: Good (unsupported)  Sitting - Dynamic: Good (unsupported)  Standing: Impaired; With support  Standing - Static: Good  Standing - Dynamic : Fair  Ambulation/Gait Training:  Distance (ft): 150 Feet (ft) (x2)  Assistive Device: Brace/Splint; Walker, rolling  Ambulation - Level of Assistance: Supervision;Stand-by asssistance; Additional time        Gait Abnormalities: Antalgic  Base of Support: Center of gravity altered  Speed/Ivy: Slow  Step Length: Left shortened;Right shortened  Interventions: Safety awareness training;Verbal cues  Stairs:  Number of Stairs Trained: 10  Stairs - Level of Assistance: Contact guard assistance;Stand-by asssistance; Additional time  Rail Use: Left     Vital Signs  Temp: 98.1 °F (36.7 °C)     Pulse (Heart Rate): (!) 114     BP: 110/64     Resp Rate: 18     O2 Sat (%): 90 %  Pain:  Pre treatment pain level:2/10  Post treatment pain level:2/10  Pain Scale 1: Numeric (0 - 10)  Activity Tolerance:   fair  After treatment:   Patient left in no apparent distress sitting up in chairX  Patient left in no apparent distress in bed  Call bell left within [address] notifiedx  Caregiver presentX  Bed alarm activated    Waylon Garrett PT   Time Calculation: 23 mins

## 2018-03-06 NOTE — ANCILLARY DISCHARGE INSTRUCTIONS
[facility]  Discharge Phone Call       After-Care Discharge Phone Call Questions: NO ANSWER     Were you able to get your prescriptions filled? Comment:      [] Yes  []No    Comment if answer is \"No\"   Are you taking your medication(s) as your doctor ordered? Do you understand the purpose of your medications? Comment:    [] Yes  []No    Comment if answer is \"No\"   Are you taking any other medications that are not on the list?  Comment:      [] Yes  []No    Comment if answer is \"Yes\"   Do you have any questions about your medications? Are you aware of potential side effects? Comment:    [] Yes  []No    Comment if answer is \"Yes\"   Did you make your follow-up appointments (if the hospital did not do this before  discharge)? Comment:    [] Yes  []No    Comment if answer is \"No\"   Is there any reason you might not be able to keep your follow-up appointments? Comment:     [] Yes  []No    Comment if answer is \"Yes\"   Do you have any questions about your care plan? Are you aware of what health problems to be alert for? Comment:    [] Yes  []No    Comment if answer is \"Yes\"   Do you have a good understanding of how you should manage your health? Comment:    [] Yes  []No    Comment if answer is \"Yes\"   Do you know which symptoms to watch for that would mean you would need to call your doctor right away? Comment:      [] Yes  []No    Comment if answer is \"No\"   Do you have any questions about the follow up process or any instructions that we have provided? Comment:    [] Yes  []No    Comment if answer is \"Yes\"   Did staff take your preferences into account?         [] Yes  []No    Comment if answer is \"Yes\"

## (undated) DEVICE — PAD PREP ALCOHOL LG STERILE -- CONVERT TO ITEM 305014

## (undated) DEVICE — MAYO STAND COVER: Brand: CONVERTORS

## (undated) DEVICE — INTENDED FOR TISSUE SEPARATION, AND OTHER PROCEDURES THAT REQUIRE A SHARP SURGICAL BLADE TO PUNCTURE OR CUT.: Brand: BARD-PARKER ® CARBON RIB-BACK BLADES

## (undated) DEVICE — BRUSH SCRB PCMX NL CLN 12ML --

## (undated) DEVICE — SHEET,DRAPE,70X100,STERILE: Brand: MEDLINE

## (undated) DEVICE — STERILE POLYISOPRENE POWDER-FREE SURGICAL GLOVES: Brand: PROTEXIS

## (undated) DEVICE — THE MILL DISPOSABLE - FINE

## (undated) DEVICE — GRAFT DELIVERY KIT

## (undated) DEVICE — SUTURE COAT VCRL PC 5 PRECIS COSM CONVENTIONAL CUT PRIM 3 8 J823H

## (undated) DEVICE — SPONGE GZ W4XL4IN COT 12 PLY TYP VII WVN C FLD DSGN

## (undated) DEVICE — SUTURE VCRL SZ 0 L36IN ABSRB UD L48MM CTX 1/2 CIR J978H

## (undated) DEVICE — SPONGE LAP 18X18IN STRL -- 5/PK

## (undated) DEVICE — ASPIRATION KIT

## (undated) DEVICE — STERILE LATEX POWDER-FREE SURGICAL GLOVESWITH NITRILE COATING: Brand: PROTEXIS

## (undated) DEVICE — REM POLYHESIVE ADULT PATIENT RETURN ELECTRODE: Brand: VALLEYLAB

## (undated) DEVICE — 12FR FRAZIER SUCTION HANDLE: Brand: CARDINAL HEALTH

## (undated) DEVICE — SUTURE ABSORBABLE BRAIDED 2-0 CT-1 27 IN UD VICRYL J259H

## (undated) DEVICE — PACKING 8004050 NEURAY 200PK 7X152MM: Brand: NEURAY ®

## (undated) DEVICE — SYRINGE BLB 50CC IRRIG PLIABLE FNGR FLNG GRAD FLSK DISP

## (undated) DEVICE — PACKING 8004051 NEURAY 200PK 13X152MM: Brand: NEURAY ®

## (undated) DEVICE — ELECTRODE BLDE L4IN NONINSULATED EDGE

## (undated) DEVICE — NDL PRT INJ NSAF BLNT 18GX1.5 --

## (undated) DEVICE — SOL IRRIGATION INJ NACL 0.9% 500ML BTL

## (undated) DEVICE — GAUZE,SPONGE,8"X4",12PLY,XRAY,STRL,LF: Brand: MEDLINE

## (undated) DEVICE — SUTURE COAT VCRL SZ 0 L18IN ABSRB UD CTX L48MM 1/2 CIR J724D

## (undated) DEVICE — X-RAY SPONGES,12 PLY: Brand: DERMACEA

## (undated) DEVICE — TOOL 14MH30 LEGEND 14CM 3MM: Brand: MIDAS REX ™

## (undated) DEVICE — SYR LR LCK 1ML GRAD NSAF 30ML --

## (undated) DEVICE — BIPOLAR FORCEPS CORD,BANANA LEADS: Brand: VALLEYLAB

## (undated) DEVICE — TRAY CATH 16F URIN MTR LTX -- CONVERT TO ITEM 363111

## (undated) DEVICE — PAD,ABDOMINAL,5"X9",STERILE,LF,1/PK: Brand: MEDLINE INDUSTRIES, INC.

## (undated) DEVICE — TOWEL SURG W16XL26IN BLU NONFENESTRATED DLX ST 2 PER PK

## (undated) DEVICE — SPINE PACK DEPAUL: Brand: MEDLINE INDUSTRIES, INC.

## (undated) DEVICE — PREP SKN DURAPREP 26ML APPL --

## (undated) DEVICE — KENDALL SCD EXPRESS SLEEVES, KNEE LENGTH, MEDIUM: Brand: KENDALL SCD

## (undated) DEVICE — GRAFT BNE MAR ART BMC MED

## (undated) DEVICE — 3M™ IOBAN™ 2 ANTIMICROBIAL INCISE DRAPE 6650EZ: Brand: IOBAN™ 2